# Patient Record
Sex: FEMALE | NOT HISPANIC OR LATINO
[De-identification: names, ages, dates, MRNs, and addresses within clinical notes are randomized per-mention and may not be internally consistent; named-entity substitution may affect disease eponyms.]

---

## 2018-10-03 PROBLEM — Z00.00 ENCOUNTER FOR PREVENTIVE HEALTH EXAMINATION: Status: ACTIVE | Noted: 2018-10-03

## 2018-10-23 ENCOUNTER — APPOINTMENT (OUTPATIENT)
Dept: BREAST CENTER | Facility: CLINIC | Age: 53
End: 2018-10-23
Payer: COMMERCIAL

## 2018-10-23 VITALS
WEIGHT: 150 LBS | BODY MASS INDEX: 23 KG/M2 | DIASTOLIC BLOOD PRESSURE: 92 MMHG | HEIGHT: 67.75 IN | SYSTOLIC BLOOD PRESSURE: 150 MMHG

## 2018-10-23 DIAGNOSIS — Z83.3 FAMILY HISTORY OF DIABETES MELLITUS: ICD-10-CM

## 2018-10-23 DIAGNOSIS — Z78.9 OTHER SPECIFIED HEALTH STATUS: ICD-10-CM

## 2018-10-23 PROCEDURE — 99243 OFF/OP CNSLTJ NEW/EST LOW 30: CPT

## 2018-11-26 ENCOUNTER — FORM ENCOUNTER (OUTPATIENT)
Age: 53
End: 2018-11-26

## 2018-11-27 ENCOUNTER — OUTPATIENT (OUTPATIENT)
Dept: OUTPATIENT SERVICES | Facility: HOSPITAL | Age: 53
LOS: 1 days | End: 2018-11-27
Payer: COMMERCIAL

## 2018-11-27 ENCOUNTER — APPOINTMENT (OUTPATIENT)
Dept: MAMMOGRAPHY | Facility: CLINIC | Age: 53
End: 2018-11-27
Payer: COMMERCIAL

## 2018-11-27 DIAGNOSIS — Z00.8 ENCOUNTER FOR OTHER GENERAL EXAMINATION: ICD-10-CM

## 2018-11-27 PROCEDURE — G0279: CPT

## 2018-11-27 PROCEDURE — 77065 DX MAMMO INCL CAD UNI: CPT | Mod: 26,RT

## 2018-11-27 PROCEDURE — G0279: CPT | Mod: 26

## 2018-11-27 PROCEDURE — 77065 DX MAMMO INCL CAD UNI: CPT

## 2018-12-09 ENCOUNTER — FORM ENCOUNTER (OUTPATIENT)
Age: 53
End: 2018-12-09

## 2018-12-10 ENCOUNTER — APPOINTMENT (OUTPATIENT)
Dept: MAMMOGRAPHY | Facility: CLINIC | Age: 53
End: 2018-12-10
Payer: COMMERCIAL

## 2018-12-10 ENCOUNTER — OUTPATIENT (OUTPATIENT)
Dept: OUTPATIENT SERVICES | Facility: HOSPITAL | Age: 53
LOS: 1 days | End: 2018-12-10
Payer: COMMERCIAL

## 2018-12-10 ENCOUNTER — RESULT REVIEW (OUTPATIENT)
Age: 53
End: 2018-12-10

## 2018-12-10 DIAGNOSIS — Z00.8 ENCOUNTER FOR OTHER GENERAL EXAMINATION: ICD-10-CM

## 2018-12-10 PROCEDURE — 19081 BX BREAST 1ST LESION STRTCTC: CPT

## 2018-12-10 PROCEDURE — 19081 BX BREAST 1ST LESION STRTCTC: CPT | Mod: RT

## 2018-12-10 PROCEDURE — A4648: CPT

## 2018-12-10 PROCEDURE — 19082 BX BREAST ADD LESION STRTCTC: CPT | Mod: RT

## 2018-12-10 PROCEDURE — 19082 BX BREAST ADD LESION STRTCTC: CPT

## 2018-12-10 PROCEDURE — 77065 DX MAMMO INCL CAD UNI: CPT | Mod: 26,RT

## 2018-12-10 PROCEDURE — 77065 DX MAMMO INCL CAD UNI: CPT

## 2018-12-12 LAB — SURGICAL PATHOLOGY FINAL REPORT - CH: SIGNIFICANT CHANGE UP

## 2019-01-06 ENCOUNTER — FORM ENCOUNTER (OUTPATIENT)
Age: 54
End: 2019-01-06

## 2019-01-07 ENCOUNTER — RESULT REVIEW (OUTPATIENT)
Age: 54
End: 2019-01-07

## 2019-01-07 ENCOUNTER — OUTPATIENT (OUTPATIENT)
Dept: OUTPATIENT SERVICES | Facility: HOSPITAL | Age: 54
LOS: 1 days | End: 2019-01-07
Payer: COMMERCIAL

## 2019-01-07 ENCOUNTER — APPOINTMENT (OUTPATIENT)
Dept: ULTRASOUND IMAGING | Facility: CLINIC | Age: 54
End: 2019-01-07
Payer: COMMERCIAL

## 2019-01-07 DIAGNOSIS — Z00.8 ENCOUNTER FOR OTHER GENERAL EXAMINATION: ICD-10-CM

## 2019-01-07 PROCEDURE — 77065 DX MAMMO INCL CAD UNI: CPT | Mod: 26,RT

## 2019-01-07 PROCEDURE — 77065 DX MAMMO INCL CAD UNI: CPT

## 2019-01-07 PROCEDURE — 19083 BX BREAST 1ST LESION US IMAG: CPT | Mod: RT

## 2019-01-07 PROCEDURE — A4648: CPT

## 2019-01-07 PROCEDURE — 88305 TISSUE EXAM BY PATHOLOGIST: CPT | Mod: 26

## 2019-01-07 PROCEDURE — 19083 BX BREAST 1ST LESION US IMAG: CPT

## 2019-01-08 LAB — SURGICAL PATHOLOGY FINAL REPORT - CH: SIGNIFICANT CHANGE UP

## 2019-01-21 ENCOUNTER — FORM ENCOUNTER (OUTPATIENT)
Age: 54
End: 2019-01-21

## 2019-01-22 ENCOUNTER — OUTPATIENT (OUTPATIENT)
Dept: OUTPATIENT SERVICES | Facility: HOSPITAL | Age: 54
LOS: 1 days | End: 2019-01-22
Payer: COMMERCIAL

## 2019-01-22 ENCOUNTER — APPOINTMENT (OUTPATIENT)
Dept: MRI IMAGING | Facility: CLINIC | Age: 54
End: 2019-01-22
Payer: COMMERCIAL

## 2019-01-22 DIAGNOSIS — Z00.8 ENCOUNTER FOR OTHER GENERAL EXAMINATION: ICD-10-CM

## 2019-01-22 PROCEDURE — C8908: CPT

## 2019-01-22 PROCEDURE — C8937: CPT

## 2019-01-22 PROCEDURE — 77049 MRI BREAST C-+ W/CAD BI: CPT | Mod: 26

## 2019-01-22 PROCEDURE — A9585: CPT

## 2019-01-28 ENCOUNTER — APPOINTMENT (OUTPATIENT)
Dept: BREAST CENTER | Facility: CLINIC | Age: 54
End: 2019-01-28
Payer: COMMERCIAL

## 2019-01-28 DIAGNOSIS — Z87.891 PERSONAL HISTORY OF NICOTINE DEPENDENCE: ICD-10-CM

## 2019-01-28 PROCEDURE — 99215 OFFICE O/P EST HI 40 MIN: CPT

## 2019-01-28 NOTE — DATA REVIEWED
[FreeTextEntry1] : Mammogram:  09/19/18   Findings:  1. Calcifications superior central left breast.  2.Questionable asymmetry lateral left breast and microcalcifications.\par \par Diagnostic mammogram: 9/27/18   Findings:  Right breast - Grouped coarse heterogeneous calcifications in the posterior superior central right breast spanning 8 mm.  Additional calcifications seen both anterior and posterior.    Left breast - grouped punctate and amorphous calcifications the majority of which layer as benign milk of calcium The area of asymmetry laterally does not persist on compression imaging.\par \par Breast ultrasound:  9/27/18  Findings:  Right breast- 12 x 7 x 11 mm hypoechoic mass at 12:00 N2.\par Left breast - 6 mm hypoechoic mass at 1:00 N4.  5 mm cyst at 1:00 N5.\par \par Stereotactic Biopsies RIGHT breast x 2: 12/10/18   1) posterior central calcifications  Pathology:  Ectatic ducts with columnar cell change and numerous calcifications.\par 2) Right breast, posterior central, calcifications( anterior group)   Pathology:  DCIS, solid and cribriform type, intermediate to  high nuclear grade with necrosis. \par \par Ultrasound guided core biopsy Right breast nodule (12:00 N2)   PATHOLOGY: Fibroadenoma\par \par Breast MRI:  1/22/19   Results:  Right breast - 1 cm area of enhancement right breast near biopsy clip  (tophat) area of DCIS.   In the LOQ Right breast - 8 mm lobulated oval enhancing mass.  MRI biopsy recommended.   Left breast - at 12:00 there is nonmass enhancement measuring 12 mm.  MR biopsy recommended.   Axilla:  No adenopathy.  14 mm T2 hyperintense liver lesion, probable cyst or hemangioma.\par \par \par

## 2019-01-28 NOTE — HISTORY OF PRESENT ILLNESS
[FreeTextEntry1] : 53 year old female diagnosed with DCIS of the right breast underwent a preoperative breast MRI and was noted to have findings in both breasts.  She is here with her  to discuss her next steps.

## 2019-02-13 ENCOUNTER — FORM ENCOUNTER (OUTPATIENT)
Age: 54
End: 2019-02-13

## 2019-02-14 ENCOUNTER — APPOINTMENT (OUTPATIENT)
Dept: MRI IMAGING | Facility: CLINIC | Age: 54
End: 2019-02-14
Payer: COMMERCIAL

## 2019-02-14 ENCOUNTER — OUTPATIENT (OUTPATIENT)
Dept: OUTPATIENT SERVICES | Facility: HOSPITAL | Age: 54
LOS: 1 days | End: 2019-02-14
Payer: COMMERCIAL

## 2019-02-14 ENCOUNTER — RESULT REVIEW (OUTPATIENT)
Age: 54
End: 2019-02-14

## 2019-02-14 DIAGNOSIS — Z00.8 ENCOUNTER FOR OTHER GENERAL EXAMINATION: ICD-10-CM

## 2019-02-14 PROCEDURE — 77066 DX MAMMO INCL CAD BI: CPT

## 2019-02-14 PROCEDURE — A9585: CPT

## 2019-02-14 PROCEDURE — 77066 DX MAMMO INCL CAD BI: CPT | Mod: 26

## 2019-02-14 PROCEDURE — 88305 TISSUE EXAM BY PATHOLOGIST: CPT | Mod: 26

## 2019-02-14 PROCEDURE — 19085 BX BREAST 1ST LESION MR IMAG: CPT

## 2019-02-14 PROCEDURE — 88305 TISSUE EXAM BY PATHOLOGIST: CPT

## 2019-02-14 PROCEDURE — A4648: CPT

## 2019-02-14 PROCEDURE — 19086 BX BREAST ADD LESION MR IMAG: CPT | Mod: LT

## 2019-02-14 PROCEDURE — 19086 BX BREAST ADD LESION MR IMAG: CPT

## 2019-02-14 PROCEDURE — 19085 BX BREAST 1ST LESION MR IMAG: CPT | Mod: RT

## 2019-02-15 LAB — SURGICAL PATHOLOGY FINAL REPORT - CH: SIGNIFICANT CHANGE UP

## 2019-03-11 ENCOUNTER — OUTPATIENT (OUTPATIENT)
Dept: OUTPATIENT SERVICES | Facility: HOSPITAL | Age: 54
LOS: 1 days | Discharge: HOME | End: 2019-03-11
Payer: COMMERCIAL

## 2019-03-11 DIAGNOSIS — D05.11 INTRADUCTAL CARCINOMA IN SITU OF RIGHT BREAST: ICD-10-CM

## 2019-03-11 DIAGNOSIS — Z98.890 OTHER SPECIFIED POSTPROCEDURAL STATES: Chronic | ICD-10-CM

## 2019-03-11 DIAGNOSIS — Z01.818 ENCOUNTER FOR OTHER PREPROCEDURAL EXAMINATION: ICD-10-CM

## 2019-03-11 LAB
ALBUMIN SERPL ELPH-MCNC: 4.4 G/DL — SIGNIFICANT CHANGE UP (ref 3.3–5)
ALP SERPL-CCNC: 69 U/L — SIGNIFICANT CHANGE UP (ref 40–120)
ALT FLD-CCNC: 23 U/L — SIGNIFICANT CHANGE UP (ref 12–78)
ANION GAP SERPL CALC-SCNC: 6 MMOL/L — SIGNIFICANT CHANGE UP (ref 5–17)
AST SERPL-CCNC: 18 U/L — SIGNIFICANT CHANGE UP (ref 15–37)
BASOPHILS # BLD AUTO: 0.04 K/UL — SIGNIFICANT CHANGE UP (ref 0–0.2)
BASOPHILS NFR BLD AUTO: 0.5 % — SIGNIFICANT CHANGE UP (ref 0–2)
BILIRUB SERPL-MCNC: 0.7 MG/DL — SIGNIFICANT CHANGE UP (ref 0.2–1.2)
BUN SERPL-MCNC: 13 MG/DL — SIGNIFICANT CHANGE UP (ref 7–23)
CALCIUM SERPL-MCNC: 9 MG/DL — SIGNIFICANT CHANGE UP (ref 8.5–10.1)
CHLORIDE SERPL-SCNC: 105 MMOL/L — SIGNIFICANT CHANGE UP (ref 96–108)
CO2 SERPL-SCNC: 27 MMOL/L — SIGNIFICANT CHANGE UP (ref 22–31)
CREAT SERPL-MCNC: 0.82 MG/DL — SIGNIFICANT CHANGE UP (ref 0.5–1.3)
EOSINOPHIL # BLD AUTO: 0.13 K/UL — SIGNIFICANT CHANGE UP (ref 0–0.5)
EOSINOPHIL NFR BLD AUTO: 1.8 % — SIGNIFICANT CHANGE UP (ref 0–6)
GLUCOSE SERPL-MCNC: 104 MG/DL — HIGH (ref 70–99)
HCT VFR BLD CALC: 42.7 % — SIGNIFICANT CHANGE UP (ref 34.5–45)
HGB BLD-MCNC: 14.8 G/DL — SIGNIFICANT CHANGE UP (ref 11.5–15.5)
IMM GRANULOCYTES NFR BLD AUTO: 0.1 % — SIGNIFICANT CHANGE UP (ref 0–1.5)
LYMPHOCYTES # BLD AUTO: 1.25 K/UL — SIGNIFICANT CHANGE UP (ref 1–3.3)
LYMPHOCYTES # BLD AUTO: 17 % — SIGNIFICANT CHANGE UP (ref 13–44)
MCHC RBC-ENTMCNC: 31.8 PG — SIGNIFICANT CHANGE UP (ref 27–34)
MCHC RBC-ENTMCNC: 34.7 GM/DL — SIGNIFICANT CHANGE UP (ref 32–36)
MCV RBC AUTO: 91.8 FL — SIGNIFICANT CHANGE UP (ref 80–100)
MONOCYTES # BLD AUTO: 0.39 K/UL — SIGNIFICANT CHANGE UP (ref 0–0.9)
MONOCYTES NFR BLD AUTO: 5.3 % — SIGNIFICANT CHANGE UP (ref 2–14)
NEUTROPHILS # BLD AUTO: 5.55 K/UL — SIGNIFICANT CHANGE UP (ref 1.8–7.4)
NEUTROPHILS NFR BLD AUTO: 75.3 % — SIGNIFICANT CHANGE UP (ref 43–77)
NRBC # BLD: 0 /100 WBCS — SIGNIFICANT CHANGE UP (ref 0–0)
PLATELET # BLD AUTO: 256 K/UL — SIGNIFICANT CHANGE UP (ref 150–400)
POTASSIUM SERPL-MCNC: 4.2 MMOL/L — SIGNIFICANT CHANGE UP (ref 3.5–5.3)
POTASSIUM SERPL-SCNC: 4.2 MMOL/L — SIGNIFICANT CHANGE UP (ref 3.5–5.3)
PROT SERPL-MCNC: 8 GM/DL — SIGNIFICANT CHANGE UP (ref 6–8.3)
RBC # BLD: 4.65 M/UL — SIGNIFICANT CHANGE UP (ref 3.8–5.2)
RBC # FLD: 12 % — SIGNIFICANT CHANGE UP (ref 10.3–14.5)
SODIUM SERPL-SCNC: 138 MMOL/L — SIGNIFICANT CHANGE UP (ref 135–145)
WBC # BLD: 7.37 K/UL — SIGNIFICANT CHANGE UP (ref 3.8–10.5)
WBC # FLD AUTO: 7.37 K/UL — SIGNIFICANT CHANGE UP (ref 3.8–10.5)

## 2019-03-11 PROCEDURE — 93010 ELECTROCARDIOGRAM REPORT: CPT

## 2019-03-11 PROCEDURE — 71046 X-RAY EXAM CHEST 2 VIEWS: CPT | Mod: 26

## 2019-03-11 NOTE — CHART NOTE - NSCHARTNOTEFT_GEN_A_CORE
Plan  1. Stop all NSAIDS, herbal supplements and vitamins for 7 days.  2. NPO at midnight.  3. Use EZ sponges as directed  4. Labs, EKG, CXR as per surgeon

## 2019-03-11 NOTE — ASU PATIENT PROFILE, ADULT - PATIENT'S HEIGHT AND WEIGHT RECORDED IN THE VITAL SIGNS FLOWSHEET
HT 68 inches   lbs  BP   T 98  P 75  R 20   O2sat 100%/yes HT 68 inches   lbs  /82  T 98  P 75  R 20   O2sat 100%/yes

## 2019-03-13 ENCOUNTER — FORM ENCOUNTER (OUTPATIENT)
Age: 54
End: 2019-03-13

## 2019-03-14 ENCOUNTER — APPOINTMENT (OUTPATIENT)
Dept: MAMMOGRAPHY | Facility: CLINIC | Age: 54
End: 2019-03-14
Payer: COMMERCIAL

## 2019-03-14 ENCOUNTER — OUTPATIENT (OUTPATIENT)
Dept: OUTPATIENT SERVICES | Facility: HOSPITAL | Age: 54
LOS: 1 days | End: 2019-03-14
Payer: COMMERCIAL

## 2019-03-14 DIAGNOSIS — Z98.890 OTHER SPECIFIED POSTPROCEDURAL STATES: Chronic | ICD-10-CM

## 2019-03-14 DIAGNOSIS — Z00.8 ENCOUNTER FOR OTHER GENERAL EXAMINATION: ICD-10-CM

## 2019-03-14 PROBLEM — D05.11 INTRADUCTAL CARCINOMA IN SITU OF RIGHT BREAST: Chronic | Status: ACTIVE | Noted: 2019-03-11

## 2019-03-14 PROBLEM — D14.32: Chronic | Status: ACTIVE | Noted: 2019-03-11

## 2019-03-14 PROCEDURE — 19281 PERQ DEVICE BREAST 1ST IMAG: CPT | Mod: RT

## 2019-03-14 PROCEDURE — 19281 PERQ DEVICE BREAST 1ST IMAG: CPT

## 2019-03-14 PROCEDURE — C1739: CPT

## 2019-03-20 NOTE — DATA REVIEWED
[FreeTextEntry1] : Mammogram:  09/19/18   Findings:  1. Calcifications superior central left breast.  2.Questionable asymmetry lateral left breast and microcalcifications.  Diagnostic mammogram: 9/27/18   Findings:  Right breast - Grouped coarse heterogeneous calcifications in the posterior superior central right breast spanning 8 mm.  Additional calcifications seen both anterior and posterior.    Left breast - grouped punctate and amorphous calcifications the majority of which layer as benign milk of calcium The area of asymmetry laterally does not persist on compression imaging.  Breast ultrasound:  9/27/18  Findings:  Right breast- 12 x 7 x 11 mm hypoechoic mass at 12:00 N2. Left breast - 6 mm hypoechoic mass at 1:00 N4.  5 mm cyst at 1:00 N5.  Stereotactic Biopsies RIGHT breast x 2: 12/10/18   1) posterior central calcifications  Pathology:  Ectatic ducts with columnar cell change and numerous calcifications. 2) Right breast, posterior central, calcifications( anterior group)   Pathology:  DCIS, solid and cribriform type, intermediate to  high nuclear grade with necrosis. (  Ultrasound guided core biopsy Right breast nodule (12:00 N2)   PATHOLOGY: Fibroadenoma  Breast MRI:  1/22/19   Results:  Right breast - 1 cm area of enhancement right breast near biopsy clip  (tophat) area of DCIS.   In the LOQ Right breast - 8 mm lobulated oval enhancing mass.  MRI biopsy recommended.   Left breast - at 12:00 there is nonmass enhancement measuring 12 mm.  MR biopsy recommended.   Axilla:  No adenopathy.  14 mm T2 hyperintense liver lesion, probable cyst or hemangioma.

## 2019-03-20 NOTE — HISTORY OF PRESENT ILLNESS
[FreeTextEntry1] : 53 year old female diagnosed with DCIS of the right breast  is being admitted to [facility] for a right breast lumpectomy with Ruth localization.

## 2019-03-20 NOTE — PHYSICAL EXAM
[Sclera nonicteric] : sclera nonicteric [Normocephalic] : normocephalic [Conjunctiva pink] : conjunctiva pink [Supple] : supple [No Supraclavicular Adenopathy] : no supraclavicular adenopathy [No Cervical Adenopathy] : no cervical adenopathy [Clear to Auscultation Bilat] : clear to auscultation bilaterally [No Thyromegaly] : no thyromegaly [Normal Sinus Rhythm] : normal sinus rhythm [No Gallops] : no gallops [No Rubs] : no pericardial rub [Examined in the supine and seated position] : examined in the supine and seated position [Symmetrical] : symmetrical [No dominant masses] : no dominant masses in right breast  [No dominant masses] : no dominant masses left breast [No Nipple Retraction] : no left nipple retraction [No Nipple Discharge] : no left nipple discharge [No Axillary Lymphadenopathy] : no left axillary lymphadenopathy [Soft] : abdomen soft [No Hepato-Splenomegaly] : no hepato-splenomegaly [No Edema] : no edema [No Rashes] : no rashes [No Ulceration] : no ulceration

## 2019-03-22 ENCOUNTER — RESULT REVIEW (OUTPATIENT)
Age: 54
End: 2019-03-22

## 2019-03-22 ENCOUNTER — APPOINTMENT (OUTPATIENT)
Dept: BREAST CENTER | Facility: HOSPITAL | Age: 54
End: 2019-03-22
Payer: COMMERCIAL

## 2019-03-22 ENCOUNTER — OUTPATIENT (OUTPATIENT)
Dept: OUTPATIENT SERVICES | Facility: HOSPITAL | Age: 54
LOS: 1 days | Discharge: HOME | End: 2019-03-22
Payer: COMMERCIAL

## 2019-03-22 VITALS
HEART RATE: 78 BPM | SYSTOLIC BLOOD PRESSURE: 158 MMHG | WEIGHT: 149.91 LBS | HEIGHT: 68 IN | RESPIRATION RATE: 14 BRPM | OXYGEN SATURATION: 100 % | DIASTOLIC BLOOD PRESSURE: 99 MMHG | TEMPERATURE: 99 F

## 2019-03-22 VITALS
RESPIRATION RATE: 14 BRPM | DIASTOLIC BLOOD PRESSURE: 83 MMHG | OXYGEN SATURATION: 100 % | HEART RATE: 74 BPM | SYSTOLIC BLOOD PRESSURE: 133 MMHG | TEMPERATURE: 97 F

## 2019-03-22 DIAGNOSIS — Z98.890 OTHER SPECIFIED POSTPROCEDURAL STATES: Chronic | ICD-10-CM

## 2019-03-22 DIAGNOSIS — D05.11 INTRADUCTAL CARCINOMA IN SITU OF RIGHT BREAST: ICD-10-CM

## 2019-03-22 LAB — HCG UR QL: NEGATIVE — SIGNIFICANT CHANGE UP

## 2019-03-22 PROCEDURE — 76098 X-RAY EXAM SURGICAL SPECIMEN: CPT | Mod: 26

## 2019-03-22 PROCEDURE — 19301 PARTIAL MASTECTOMY: CPT

## 2019-03-22 PROCEDURE — 88305 TISSUE EXAM BY PATHOLOGIST: CPT | Mod: 26

## 2019-03-22 PROCEDURE — 88377 M/PHMTRC ALYS ISHQUANT/SEMIQ: CPT | Mod: 26

## 2019-03-22 PROCEDURE — 88307 TISSUE EXAM BY PATHOLOGIST: CPT | Mod: 26

## 2019-03-22 PROCEDURE — 88342 IMHCHEM/IMCYTCHM 1ST ANTB: CPT | Mod: 26,59

## 2019-03-22 PROCEDURE — 88341 IMHCHEM/IMCYTCHM EA ADD ANTB: CPT | Mod: 26,59

## 2019-03-22 PROCEDURE — 88360 TUMOR IMMUNOHISTOCHEM/MANUAL: CPT | Mod: 26

## 2019-03-22 RX ORDER — PROCHLORPERAZINE MALEATE 5 MG
10 TABLET ORAL ONCE
Qty: 0 | Refills: 0 | Status: COMPLETED | OUTPATIENT
Start: 2019-03-22 | End: 2019-03-22

## 2019-03-22 RX ORDER — OXYCODONE HYDROCHLORIDE 5 MG/1
1 TABLET ORAL
Qty: 8 | Refills: 0 | OUTPATIENT
Start: 2019-03-22 | End: 2019-03-23

## 2019-03-22 RX ORDER — MEPERIDINE HYDROCHLORIDE 50 MG/ML
12.5 INJECTION INTRAMUSCULAR; INTRAVENOUS; SUBCUTANEOUS
Qty: 0 | Refills: 0 | Status: DISCONTINUED | OUTPATIENT
Start: 2019-03-22 | End: 2019-03-22

## 2019-03-22 RX ORDER — OXYCODONE HYDROCHLORIDE 5 MG/1
5 TABLET ORAL ONCE
Qty: 0 | Refills: 0 | Status: DISCONTINUED | OUTPATIENT
Start: 2019-03-22 | End: 2019-03-22

## 2019-03-22 RX ORDER — SODIUM CHLORIDE 9 MG/ML
1000 INJECTION, SOLUTION INTRAVENOUS
Qty: 0 | Refills: 0 | Status: DISCONTINUED | OUTPATIENT
Start: 2019-03-22 | End: 2019-03-22

## 2019-03-22 RX ORDER — FENTANYL CITRATE 50 UG/ML
50 INJECTION INTRAVENOUS
Qty: 0 | Refills: 0 | Status: DISCONTINUED | OUTPATIENT
Start: 2019-03-22 | End: 2019-03-22

## 2019-03-22 RX ORDER — ONDANSETRON 8 MG/1
4 TABLET, FILM COATED ORAL ONCE
Qty: 0 | Refills: 0 | Status: COMPLETED | OUTPATIENT
Start: 2019-03-22 | End: 2019-03-22

## 2019-03-22 RX ADMIN — ONDANSETRON 4 MILLIGRAM(S): 8 TABLET, FILM COATED ORAL at 11:38

## 2019-03-22 RX ADMIN — Medication 10 MILLIGRAM(S): at 11:54

## 2019-03-22 NOTE — ASU PATIENT PROFILE, ADULT - PSH
History of breast surgery  right breast - excision of cyst 1987  History of lung surgery  resection of left  lung tumor 1984

## 2019-03-22 NOTE — ASU DISCHARGE PLAN (ADULT/PEDIATRIC) - CALL YOUR DOCTOR IF YOU HAVE ANY OF THE FOLLOWING:
Wound/Surgical Site with redness, or foul smelling discharge or pus/Bleeding that does not stop/Swelling that gets worse/Pain not relieved by Medications

## 2019-03-22 NOTE — BRIEF OPERATIVE NOTE - NSICDXBRIEFPREOP_GEN_ALL_CORE_FT
PRE-OP DIAGNOSIS:  Ductal carcinoma in situ of right breast 22-Mar-2019 10:05:45  Roscoe Gandhi
PRE-OP DIAGNOSIS:  Ductal carcinoma in situ of right breast 22-Mar-2019 10:05:45  Roscoe Gandhi  Ductal carcinoma in situ (DCIS) of right breast 22-Mar-2019 11:36:13  Yancy Berrios

## 2019-03-22 NOTE — BRIEF OPERATIVE NOTE - OPERATION/FINDINGS
yamila  /clip  identified on ontraop radiograph within specimen
Localizing clip with adjacent calcifications  and Ruth reflector identified on specimen radiograph.  Margin Probe identified 4 positive margins - lateral, superior, posterior and medial.

## 2019-03-22 NOTE — ASU DISCHARGE PLAN (ADULT/PEDIATRIC) - ASU DC SPECIAL INSTRUCTIONSFT
May use icepack (15 minutes on/off) to site for pain relief  Use extra strength Tylenol prn as instructed on bottle for pain relief  oxycodone prescription sent to pharmacy to use for break through pain - do not drive while taking this pain medication    Do not use NSAIDS (example : Ibuprofen,motrin , advil etc) over the next few days

## 2019-03-22 NOTE — ASU DISCHARGE PLAN (ADULT/PEDIATRIC) - CARE PROVIDER_API CALL
Yancy Berrios)  Surgery  [address], [place] [address]  Phone: [phone]  Fax: [phone]  Follow Up Time:

## 2019-03-22 NOTE — ASU DISCHARGE PLAN (ADULT/PEDIATRIC) - PROCEDURE
right breast lumpectomy with yamila  localization right breast lumpectomy with yamila  localization,  radiofrequency assessment using Margin Probe

## 2019-03-22 NOTE — BRIEF OPERATIVE NOTE - NSICDXBRIEFPOSTOP_GEN_ALL_CORE_FT
POST-OP DIAGNOSIS:  Ductal carcinoma of right breast 22-Mar-2019 10:08:34  Roscoe Gandhi
POST-OP DIAGNOSIS:  Ductal carcinoma in situ (DCIS) of right breast 22-Mar-2019 11:36:26  Yancy Berrios  Ductal carcinoma of right breast 22-Mar-2019 10:08:34  Roscoe Gandhi

## 2019-03-22 NOTE — BRIEF OPERATIVE NOTE - NSICDXBRIEFPROCEDURE_GEN_ALL_CORE_FT
PROCEDURES:  Lumpectomy, breast, right 22-Mar-2019 10:04:22 with yamila  Roscoe Gandhi
PROCEDURES:  Right breast lumpectomy 22-Mar-2019 11:34:46 lumpectomy with Ruth localization , Radiofrequency margin assessment with Margin Probe Yancy Berrios  Lumpectomy, breast, right 22-Mar-2019 10:04:22 with ruth  Roscoe Gandhi

## 2019-03-27 LAB — SURGICAL PATHOLOGY FINAL REPORT - CH: SIGNIFICANT CHANGE UP

## 2019-03-29 DIAGNOSIS — D05.11 INTRADUCTAL CARCINOMA IN SITU OF RIGHT BREAST: ICD-10-CM

## 2019-03-29 DIAGNOSIS — J30.1 ALLERGIC RHINITIS DUE TO POLLEN: ICD-10-CM

## 2019-03-29 DIAGNOSIS — N64.1 FAT NECROSIS OF BREAST: ICD-10-CM

## 2019-03-29 DIAGNOSIS — J30.81 ALLERGIC RHINITIS DUE TO ANIMAL (CAT) (DOG) HAIR AND DANDER: ICD-10-CM

## 2019-03-29 DIAGNOSIS — N61.0 MASTITIS WITHOUT ABSCESS: ICD-10-CM

## 2019-03-29 DIAGNOSIS — J30.89 OTHER ALLERGIC RHINITIS: ICD-10-CM

## 2019-03-29 DIAGNOSIS — Z87.891 PERSONAL HISTORY OF NICOTINE DEPENDENCE: ICD-10-CM

## 2019-03-29 DIAGNOSIS — Z83.3 FAMILY HISTORY OF DIABETES MELLITUS: ICD-10-CM

## 2019-03-29 DIAGNOSIS — N64.89 OTHER SPECIFIED DISORDERS OF BREAST: ICD-10-CM

## 2019-03-29 DIAGNOSIS — N60.21 FIBROADENOSIS OF RIGHT BREAST: ICD-10-CM

## 2019-03-29 DIAGNOSIS — N60.31 FIBROSCLEROSIS OF RIGHT BREAST: ICD-10-CM

## 2019-04-01 ENCOUNTER — APPOINTMENT (OUTPATIENT)
Dept: BREAST CENTER | Facility: CLINIC | Age: 54
End: 2019-04-01
Payer: COMMERCIAL

## 2019-04-01 PROCEDURE — 99024 POSTOP FOLLOW-UP VISIT: CPT

## 2019-04-01 NOTE — PAST MEDICAL HISTORY
[Menarche Age ____] : age at menarche was [unfilled] [Definite ___ (Date)] : the last menstrual period was [unfilled] [Irregular Cycle Intervals] : are  irregular

## 2019-04-08 NOTE — HISTORY OF PRESENT ILLNESS
[FreeTextEntry1] : 53 year old perimenopausal female presents for a postop visit.  She underwent a right lumpectomy with Ruth localization on 3/22/19 for DCIS.  Final pathology revealed 2 small foci of invasive ductal carcinoma.

## 2019-04-08 NOTE — DATA REVIEWED
[FreeTextEntry1] : Mammogram:  09/19/18   Findings:  1. Calcifications superior central left breast.  2.Questionable asymmetry lateral left breast and microcalcifications.  Diagnostic mammogram: 9/27/18   Findings:  Right breast - Grouped coarse heterogeneous calcifications in the posterior superior central right breast spanning 8 mm.  Additional calcifications seen both anterior and posterior.    Left breast - grouped punctate and amorphous calcifications the majority of which layer as benign milk of calcium The area of asymmetry laterally does not persist on compression imaging.  Breast ultrasound:  9/27/18  Findings:  Right breast- 12 x 7 x 11 mm hypoechoic mass at 12:00 N2. Left breast - 6 mm hypoechoic mass at 1:00 N4.  5 mm cyst at 1:00 N5.  Stereotactic Biopsies RIGHT breast x 2: 12/10/18   1) posterior central calcifications  Pathology:  Ectatic ducts with columnar cell change and numerous calcifications. 2) Right breast, posterior central, calcifications( anterior group)   Pathology:  DCIS, solid and cribriform type, intermediate to  high nuclear grade with necrosis. (  Ultrasound guided core biopsy Right breast nodule (12:00 N2)   PATHOLOGY: Fibroadenoma  Breast MRI:  1/22/19   Results:  Right breast - 1 cm area of enhancement right breast near biopsy clip  (tophat) area of DCIS.   In the LOQ Right breast - 8 mm lobulated oval enhancing mass.  MRI biopsy recommended.   Left breast - at 12:00 there is nonmass enhancement measuring 12 mm.  MR biopsy recommended.   Axilla:  No adenopathy.  14 mm T2 hyperintense liver lesion, probable cyst or hemangioma.   \par \par SURGICAL PATHOLOGY:  3/27/19   ( rIGHT LUMPECTOMY 12:00)   RESULTS:   intermediate to high grade DCIS. 2 foci of infiltrating ductal carcinoma ( 7/9) measuring 2 and 3 mm each.  Margins of resection negative.   ER/ME/HER2 pending on invasive component.

## 2019-04-16 NOTE — PAST MEDICAL HISTORY
[Definite ___ (Date)] : the last menstrual period was [unfilled] [Menarche Age ____] : age at menarche was [unfilled] [Irregular Cycle Intervals] : are  irregular

## 2019-04-16 NOTE — PHYSICAL EXAM
[Atraumatic] : atraumatic [Normocephalic] : normocephalic [Supple] : supple [No Supraclavicular Adenopathy] : no supraclavicular adenopathy [Examined in the supine and seated position] : examined in the supine and seated position [No dominant masses] : no dominant masses in right breast  [No dominant masses] : no dominant masses left breast [No Nipple Retraction] : no right nipple retraction [No Nipple Discharge] : no left nipple discharge [No Axillary Lymphadenopathy] : no right axillary lymphadenopathy [No Edema] : no edema [No Rashes] : no rashes [de-identified] : Lumpectomy incision at 12-1:00 healing well.  Mild swelling [No Ulceration] : no ulceration

## 2019-04-16 NOTE — HISTORY OF PRESENT ILLNESS
[FreeTextEntry1] : 53 year old perimenopausal female underwent a right lumpectomy with Ruth localization on 3/22/19 for DCIS.  Final pathology revealed 2 small foci of invasive ductal carcinoma.  Margins of resection were negative.   The patient is being admitted to [facility] for a right axillary sentinel node biopsy with nuclear injection and possible axillary node dissection.

## 2019-04-16 NOTE — DATA REVIEWED
[FreeTextEntry1] : Mammogram:  09/19/18   Findings:  1. Calcifications superior central left breast.  2.Questionable asymmetry lateral left breast and microcalcifications.  Diagnostic mammogram: 9/27/18   Findings:  Right breast - Grouped coarse heterogeneous calcifications in the posterior superior central right breast spanning 8 mm.  Additional calcifications seen both anterior and posterior.    Left breast - grouped punctate and amorphous calcifications the majority of which layer as benign milk of calcium The area of asymmetry laterally does not persist on compression imaging.  Breast ultrasound:  9/27/18  Findings:  Right breast- 12 x 7 x 11 mm hypoechoic mass at 12:00 N2. Left breast - 6 mm hypoechoic mass at 1:00 N4.  5 mm cyst at 1:00 N5.  Stereotactic Biopsies RIGHT breast x 2: 12/10/18   1) posterior central calcifications  Pathology:  Ectatic ducts with columnar cell change and numerous calcifications. 2) Right breast, posterior central, calcifications( anterior group)   Pathology:  DCIS, solid and cribriform type, intermediate to  high nuclear grade with necrosis. (  Ultrasound guided core biopsy Right breast nodule (12:00 N2)   PATHOLOGY: Fibroadenoma  Breast MRI:  1/22/19   Results:  Right breast - 1 cm area of enhancement right breast near biopsy clip  (tophat) area of DCIS.   In the LOQ Right breast - 8 mm lobulated oval enhancing mass.  MRI biopsy recommended.   Left breast - at 12:00 there is nonmass enhancement measuring 12 mm.  MR biopsy recommended.   Axilla:  No adenopathy.  14 mm T2 hyperintense liver lesion, probable cyst or hemangioma.   \par \par SURGICAL PATHOLOGY:  3/27/19   ( rIGHT LUMPECTOMY 12:00)   RESULTS:   intermediate to high grade DCIS. 2 foci of infiltrating ductal carcinoma ( 7/9) measuring 2 and 3 mm each.  Margins of resection negative.   ER/[place]/HER2 - 50%/1-5%/negative.

## 2019-04-18 ENCOUNTER — APPOINTMENT (OUTPATIENT)
Dept: BREAST CENTER | Facility: HOSPITAL | Age: 54
End: 2019-04-18
Payer: COMMERCIAL

## 2019-04-18 ENCOUNTER — OUTPATIENT (OUTPATIENT)
Dept: OUTPATIENT SERVICES | Facility: HOSPITAL | Age: 54
LOS: 1 days | Discharge: HOME | End: 2019-04-18
Payer: COMMERCIAL

## 2019-04-18 ENCOUNTER — RESULT REVIEW (OUTPATIENT)
Age: 54
End: 2019-04-18

## 2019-04-18 VITALS
OXYGEN SATURATION: 98 % | TEMPERATURE: 98 F | HEIGHT: 68 IN | HEART RATE: 86 BPM | WEIGHT: 149.91 LBS | RESPIRATION RATE: 16 BRPM | DIASTOLIC BLOOD PRESSURE: 90 MMHG | SYSTOLIC BLOOD PRESSURE: 145 MMHG

## 2019-04-18 VITALS
SYSTOLIC BLOOD PRESSURE: 139 MMHG | DIASTOLIC BLOOD PRESSURE: 73 MMHG | HEART RATE: 87 BPM | RESPIRATION RATE: 16 BRPM | OXYGEN SATURATION: 100 % | TEMPERATURE: 98 F

## 2019-04-18 DIAGNOSIS — Z98.890 OTHER SPECIFIED POSTPROCEDURAL STATES: Chronic | ICD-10-CM

## 2019-04-18 LAB — HCG UR QL: NEGATIVE — SIGNIFICANT CHANGE UP

## 2019-04-18 PROCEDURE — 88307 TISSUE EXAM BY PATHOLOGIST: CPT | Mod: 26

## 2019-04-18 PROCEDURE — 38792 RA TRACER ID OF SENTINL NODE: CPT | Mod: 58

## 2019-04-18 PROCEDURE — 38525 BIOPSY/REMOVAL LYMPH NODES: CPT | Mod: 58

## 2019-04-18 PROCEDURE — 88305 TISSUE EXAM BY PATHOLOGIST: CPT | Mod: 26

## 2019-04-18 RX ORDER — ACETAMINOPHEN 500 MG
975 TABLET ORAL ONCE
Qty: 0 | Refills: 0 | Status: COMPLETED | OUTPATIENT
Start: 2019-04-18 | End: 2019-04-18

## 2019-04-18 RX ORDER — FENTANYL CITRATE 50 UG/ML
50 INJECTION INTRAVENOUS
Qty: 0 | Refills: 0 | Status: DISCONTINUED | OUTPATIENT
Start: 2019-04-18 | End: 2019-04-18

## 2019-04-18 RX ORDER — FENTANYL CITRATE 50 UG/ML
25 INJECTION INTRAVENOUS
Qty: 0 | Refills: 0 | Status: DISCONTINUED | OUTPATIENT
Start: 2019-04-18 | End: 2019-04-18

## 2019-04-18 RX ORDER — ONDANSETRON 8 MG/1
4 TABLET, FILM COATED ORAL EVERY 6 HOURS
Qty: 0 | Refills: 0 | Status: DISCONTINUED | OUTPATIENT
Start: 2019-04-18 | End: 2019-05-03

## 2019-04-18 RX ORDER — OXYCODONE HYDROCHLORIDE 5 MG/1
10 TABLET ORAL ONCE
Qty: 0 | Refills: 0 | Status: DISCONTINUED | OUTPATIENT
Start: 2019-04-18 | End: 2019-04-18

## 2019-04-18 RX ORDER — OXYCODONE HYDROCHLORIDE 5 MG/1
5 TABLET ORAL ONCE
Qty: 0 | Refills: 0 | Status: DISCONTINUED | OUTPATIENT
Start: 2019-04-18 | End: 2019-04-18

## 2019-04-18 RX ORDER — ONDANSETRON 8 MG/1
4 TABLET, FILM COATED ORAL ONCE
Qty: 0 | Refills: 0 | Status: COMPLETED | OUTPATIENT
Start: 2019-04-18 | End: 2019-04-18

## 2019-04-18 RX ORDER — OXYCODONE HYDROCHLORIDE 5 MG/1
5 TABLET ORAL EVERY 6 HOURS
Qty: 0 | Refills: 0 | Status: DISCONTINUED | OUTPATIENT
Start: 2019-04-18 | End: 2019-04-18

## 2019-04-18 RX ORDER — FAMOTIDINE 10 MG/ML
20 INJECTION INTRAVENOUS ONCE
Qty: 0 | Refills: 0 | Status: COMPLETED | OUTPATIENT
Start: 2019-04-18 | End: 2019-04-18

## 2019-04-18 RX ADMIN — Medication 975 MILLIGRAM(S): at 09:28

## 2019-04-18 RX ADMIN — FAMOTIDINE 20 MILLIGRAM(S): 10 INJECTION INTRAVENOUS at 09:28

## 2019-04-18 RX ADMIN — ONDANSETRON 4 MILLIGRAM(S): 8 TABLET, FILM COATED ORAL at 11:30

## 2019-04-18 NOTE — ASU PATIENT PROFILE, ADULT - TEACHING/LEARNING LEARNING PREFERENCES
individual instruction/audio/verbal instruction/video/computer/internet/group instruction/pictorial/skill demonstration/written material

## 2019-04-18 NOTE — ASU DISCHARGE PLAN (ADULT/PEDIATRIC) - CARE PROVIDER_API CALL
Yancy Berrios)  Surgery  [address], [place] [address]  Phone: [phone]  Fax: [phone]  Follow Up Time:

## 2019-04-18 NOTE — ASU PATIENT PROFILE, ADULT - PSH
History of breast surgery  right breast - excision of cyst 1987  History of lumpectomy of right breast    History of lung surgery  resection of left  lung tumor 1984

## 2019-04-18 NOTE — BRIEF OPERATIVE NOTE - NSICDXBRIEFPROCEDURE_GEN_ALL_CORE_FT
PROCEDURES:  [place] node mapping with biopsy 18-Apr-2019 11:10:47 right axillary Yancy Berrios

## 2019-04-22 LAB — SURGICAL PATHOLOGY STUDY: SIGNIFICANT CHANGE UP

## 2019-04-24 DIAGNOSIS — Z83.3 FAMILY HISTORY OF DIABETES MELLITUS: ICD-10-CM

## 2019-04-24 DIAGNOSIS — J30.1 ALLERGIC RHINITIS DUE TO POLLEN: ICD-10-CM

## 2019-04-24 DIAGNOSIS — D05.11 INTRADUCTAL CARCINOMA IN SITU OF RIGHT BREAST: ICD-10-CM

## 2019-04-24 DIAGNOSIS — Z87.891 PERSONAL HISTORY OF NICOTINE DEPENDENCE: ICD-10-CM

## 2019-04-24 DIAGNOSIS — Z17.0 ESTROGEN RECEPTOR POSITIVE STATUS [ER+]: ICD-10-CM

## 2019-04-24 DIAGNOSIS — J30.81 ALLERGIC RHINITIS DUE TO ANIMAL (CAT) (DOG) HAIR AND DANDER: ICD-10-CM

## 2019-04-30 ENCOUNTER — APPOINTMENT (OUTPATIENT)
Dept: BREAST CENTER | Facility: CLINIC | Age: 54
End: 2019-04-30
Payer: COMMERCIAL

## 2019-04-30 DIAGNOSIS — R92.0 MAMMOGRAPHIC MICROCALCIFICATION FOUND ON DIAGNOSTIC IMAGING OF BREAST: ICD-10-CM

## 2019-04-30 DIAGNOSIS — R92.8 OTHER ABNORMAL AND INCONCLUSIVE FINDINGS ON DIAGNOSTIC IMAGING OF BREAST: ICD-10-CM

## 2019-04-30 PROCEDURE — 99024 POSTOP FOLLOW-UP VISIT: CPT

## 2019-04-30 NOTE — DATA REVIEWED
[FreeTextEntry1] : Mammogram:  09/19/18   Findings:  1. Calcifications superior central left breast.  2.Questionable asymmetry lateral left breast and microcalcifications.  Diagnostic mammogram: 9/27/18   Findings:  Right breast - Grouped coarse heterogeneous calcifications in the posterior superior central right breast spanning 8 mm.  Additional calcifications seen both anterior and posterior.    Left breast - grouped punctate and amorphous calcifications the majority of which layer as benign milk of calcium The area of asymmetry laterally does not persist on compression imaging.  Breast ultrasound:  9/27/18  Findings:  Right breast- 12 x 7 x 11 mm hypoechoic mass at 12:00 N2. Left breast - 6 mm hypoechoic mass at 1:00 N4.  5 mm cyst at 1:00 N5.  Stereotactic Biopsies RIGHT breast x 2: 12/10/18   1) posterior central calcifications  Pathology:  Ectatic ducts with columnar cell change and numerous calcifications. 2) Right breast, posterior central, calcifications( anterior group)   Pathology:  DCIS, solid and cribriform type, intermediate to  high nuclear grade with necrosis. (  Ultrasound guided core biopsy Right breast nodule (12:00 N2)   PATHOLOGY: Fibroadenoma  Breast MRI:  1/22/19   Results:  Right breast - 1 cm area of enhancement right breast near biopsy clip  (tophat) area of DCIS.   In the LOQ Right breast - 8 mm lobulated oval enhancing mass.  MRI biopsy recommended.   Left breast - at 12:00 there is nonmass enhancement measuring 12 mm.  MR biopsy recommended.   Axilla:  No adenopathy.  14 mm T2 hyperintense liver lesion, probable cyst or hemangioma.   \par \par SURGICAL PATHOLOGY:  3/27/19   ( rIGHT LUMPECTOMY 12:00)   RESULTS:   intermediate to high grade DCIS. 2 foci of infiltrating ductal carcinoma ( 7/9) measuring 2 and 3 mm each.  Margins of resection negative.   ER/[place]/HER2 - 50%/1-5%/negative.\par \par Right [place] Node Biopsy:  4/18/19    Results:  2 sentinel nodes - negative for metastases and 1 nonsentinel node - negative.

## 2019-04-30 NOTE — CONSULT LETTER
[Dear  ___] : Dear ~CHRISTOPHER, [Courtesy Letter:] : I had the pleasure of seeing your patient, [unfilled], in my office today. [Please see my note below.] : Please see my note below. [Consult Closing:] : Thank you very much for allowing me to participate in the care of this patient.  If you have any questions, please do not hesitate to contact me. [Sincerely,] : Sincerely, [FreeTextEntry2] : Yuly Pickens, NP [FreeTextEntry3] : Yancy Berrios MD FACS\par

## 2019-04-30 NOTE — HISTORY OF PRESENT ILLNESS
[FreeTextEntry1] : 53 year old perimenopausal female underwent a right lumpectomy with Ruth localization on 3/22/19 for DCIS.  Final pathology revealed 2 small foci of invasive ductal carcinoma.  Margins of resection were negative.   The patient then underwent a right sentinel node biopsy on 4/18/19. [place] nodes were negative.\par \par TNM staging:  [id number]     1A

## 2019-04-30 NOTE — PHYSICAL EXAM
[Normocephalic] : normocephalic [Atraumatic] : atraumatic [Supple] : supple [No Supraclavicular Adenopathy] : no supraclavicular adenopathy [Examined in the supine and seated position] : examined in the supine and seated position [No dominant masses] : no dominant masses in right breast  [No dominant masses] : no dominant masses left breast [No Nipple Retraction] : no left nipple retraction [No Nipple Discharge] : no left nipple discharge [No Edema] : no edema [No Rashes] : no rashes [No Ulceration] : no ulceration [de-identified] : Lumpectomy incision at 12-1:00 healing well.   [de-identified] : Incision healing well.

## 2019-05-03 PROBLEM — R92.8 ABNORMAL ULTRASOUND OF BREAST: Status: RESOLVED | Noted: 2018-10-23 | Resolved: 2019-05-03

## 2019-05-03 PROBLEM — R92.0 ABNORMAL MAMMOGRAM WITH MICROCALCIFICATION: Status: RESOLVED | Noted: 2018-10-23 | Resolved: 2019-05-03

## 2019-05-14 ENCOUNTER — APPOINTMENT (OUTPATIENT)
Dept: HEMATOLOGY ONCOLOGY | Facility: CLINIC | Age: 54
End: 2019-05-14
Payer: COMMERCIAL

## 2019-05-14 VITALS
TEMPERATURE: 98.6 F | WEIGHT: 152 LBS | DIASTOLIC BLOOD PRESSURE: 101 MMHG | HEIGHT: 67.75 IN | HEART RATE: 80 BPM | BODY MASS INDEX: 23.31 KG/M2 | SYSTOLIC BLOOD PRESSURE: 173 MMHG

## 2019-05-14 DIAGNOSIS — Z80.1 FAMILY HISTORY OF MALIGNANT NEOPLASM OF TRACHEA, BRONCHUS AND LUNG: ICD-10-CM

## 2019-05-14 PROCEDURE — 99244 OFF/OP CNSLTJ NEW/EST MOD 40: CPT

## 2019-05-14 NOTE — CONSULT LETTER
[( Thank you for referring [unfilled] for consultation for _____ )] : Thank you for referring [unfilled] for consultation for [unfilled] [Dear  ___] : Dear ~CHRISTOPHER, [Sincerely,] : Sincerely, [Consult Closing:] : Thank you very much for allowing me to participate in the care of this patient.  If you have any questions, please do not hesitate to contact me. [Please see my note below.] : Please see my note below. [FreeTextEntry3] : Bebe Loomis [FreeTextEntry2] : Dr Yancy Christiansenkit

## 2019-05-14 NOTE — ASSESSMENT
Problem: Goal Outcome Summary  Goal: Goal Outcome Summary  Outcome: No Change  Pt slept.  No complaints.  Plan to continue antibiotics and monitor.       [FreeTextEntry1] : 52 y/o perimenopausal woman diagnosed with small stage IA  ( T1a, N0) ductal cancer  of the right breast ER/NE positive and HER 2 negative, s/p lumpectomy and sentinel LN biopsy ( March- April 2019).\par Discussed diagnosis, very good prognosis, very small risk of systemic and local recurrence. Discussed benefits of adjuvant hormonal therapy- will reduce risk of recurrence by ~ 40%  but benefit in absolute terms will be very small. Explained potential adverse effects of Tamoxifen , monitoring. \par Patient is interested in trying Tamoxifen.\par Rx Tamoxifen 20 mg daily- she will start after radiation. \par Return visit in 6 months/ sooner PRN.

## 2019-05-14 NOTE — REASON FOR VISIT
[Initial Consultation] : an initial consultation [Spouse] : spouse [FreeTextEntry2] : breast cancer , here to discuss adjuvant therapy

## 2019-05-14 NOTE — PHYSICAL EXAM
[Fully active, able to carry on all pre-disease performance without restriction] : Status 0 - Fully active, able to carry on all pre-disease performance without restriction [Normal] : affect appropriate [de-identified] : healed small lumpectomy scar RT breast at 12:00 and axillary scar, no masses, left breast no masses

## 2019-05-14 NOTE — HISTORY OF PRESENT ILLNESS
[Disease: _____________________] : Disease: [unfilled] [T: ___] : T[unfilled] [N: ___] : N[unfilled] [AJCC Stage: ____] : AJCC Stage: [unfilled] [de-identified] : 54 y/o perimenopausal woman presented with an abnormality on screening mammogram.\par Right breast biopsy showed DCIS.\par 3/22/19 Right lumpectomy: two foci of invasive ductal cancer 0.2 cm and 0.4 cm, mod diff , ER 50 % [place] 1-5 %  HER 2 1-2  CISH negative. Residual 0.4 cm and 0.6 cm DCIS. Margins negative.\par 4/18/19 right sentinel node  biopsy : three LNs negative \par T1a, N0  stage I A.\par \par She is in good general health. Not on any prescription medications. Yearly GYN exam- OK ( 5 months ago). LMP Dec 2018.\par  [de-identified] : ER 50 %  [place] 1-5 %  HER 2 CISH neg  [de-identified] : mod diff invasive ductal cancer SBR 6-7/9

## 2019-11-11 ENCOUNTER — APPOINTMENT (OUTPATIENT)
Dept: BREAST CENTER | Facility: CLINIC | Age: 54
End: 2019-11-11
Payer: COMMERCIAL

## 2019-11-11 VITALS — DIASTOLIC BLOOD PRESSURE: 101 MMHG | SYSTOLIC BLOOD PRESSURE: 159 MMHG | HEART RATE: 96 BPM | HEIGHT: 68 IN

## 2019-11-11 PROCEDURE — 99214 OFFICE O/P EST MOD 30 MIN: CPT

## 2019-11-11 NOTE — HISTORY OF PRESENT ILLNESS
[FreeTextEntry1] : 53 year old perimenopausal female underwent a right lumpectomy with Ruth localization on 3/22/19 for DCIS.  Final pathology revealed 2 small foci of invasive ductal carcinoma.  Margins of resection were negative.   The patient then underwent a right sentinel node biopsy on 4/18/19. [place] nodes were negative.  She completed a course of radiation therapy and was started on Tamoxifen.\par Patient presents for a surveillance breast examination.\par \par Pathology: moderately  differentiated invasive ductal cancer SBR 6-7/9 \par TNM stage: [id number]\par AJCC Stage: IA \par Tumor Markers: ER 50 % CA 1-5 % HER 2 CISH

## 2019-11-11 NOTE — PHYSICAL EXAM
[Normocephalic] : normocephalic [Supple] : supple [No Supraclavicular Adenopathy] : no supraclavicular adenopathy [Examined in the supine and seated position] : examined in the supine and seated position [No dominant masses] : no dominant masses in right breast  [No dominant masses] : no dominant masses left breast [No Nipple Retraction] : no left nipple retraction [No Nipple Discharge] : no left nipple discharge [No Edema] : no edema [No Rashes] : no rashes [No Ulceration] : no ulceration [Symmetrical] : symmetrical [No Axillary Lymphadenopathy] : no left axillary lymphadenopathy [Soft] : abdomen soft [Normal Bowel Sounds] : normal bowel sounds  [No Hepato-Splenomegaly] : no hepato-splenomegaly [Sclera nonicteric] : sclera nonicteric [Conjunctiva pink] : conjunctiva pink [Clear to Auscultation Bilat] : clear to auscultation bilaterally [No Gallops] : no gallops [Normal Sinus Rhythm] : normal sinus rhythm [No Rubs] : no pericardial rub [Grade 2] : Ptosis Grade 2 [de-identified] : Lumpectomy incision at 12-1:00 . [de-identified] : Incision healing well.

## 2019-11-11 NOTE — PAST MEDICAL HISTORY
[Menarche Age ____] : age at menarche was [unfilled] [Definite ___ (Date)] : the last menstrual period was [unfilled] [Total Preg ___] : G[unfilled] [Live Births ___] : P[unfilled]  [Age At Live Birth ___] : Age at live birth: [unfilled] [Perimenopausal] : The patient is perimenopausal

## 2019-11-11 NOTE — DATA REVIEWED
[FreeTextEntry1] : Mammogram:  09/19/18   Findings:  1. Calcifications superior central left breast.  2.Questionable asymmetry lateral left breast and microcalcifications.  Diagnostic mammogram: 9/27/18   Findings:  Right breast - Grouped coarse heterogeneous calcifications in the posterior superior central right breast spanning 8 mm.  Additional calcifications seen both anterior and posterior.    Left breast - grouped punctate and amorphous calcifications the majority of which layer as benign milk of calcium The area of asymmetry laterally does not persist on compression imaging.  Breast ultrasound:  9/27/18  Findings:  Right breast- 12 x 7 x 11 mm hypoechoic mass at 12:00 N2. Left breast - 6 mm hypoechoic mass at 1:00 N4.  5 mm cyst at 1:00 N5.  Stereotactic Biopsies RIGHT breast x 2: 12/10/18   1) posterior central calcifications  Pathology:  Ectatic ducts with columnar cell change and numerous calcifications. 2) Right breast, posterior central, calcifications( anterior group)   Pathology:  DCIS, solid and cribriform type, intermediate to  high nuclear grade with necrosis. (  Ultrasound guided core biopsy Right breast nodule (12:00 N2)   PATHOLOGY: Fibroadenoma  Breast MRI:  1/22/19   Results:  Right breast - 1 cm area of enhancement right breast near biopsy clip  (tophat) area of DCIS.   In the LOQ Right breast - 8 mm lobulated oval enhancing mass.  MRI biopsy recommended.   Left breast - at 12:00 there is nonmass enhancement measuring 12 mm.  MR biopsy recommended.   Axilla:  No adenopathy.  14 mm T2 hyperintense liver lesion, probable cyst or hemangioma.   \par \par SURGICAL PATHOLOGY:  3/27/19   ( rIGHT LUMPECTOMY 12:00)   RESULTS:   intermediate to high grade DCIS. 2 foci of infiltrating ductal carcinoma ( 7/9) measuring 2 and 3 mm each.  Margins of resection negative.   ER/[place]/HER2 - 50%/1-5%/negative.\par \par Right [place] Node Biopsy:  4/18/19    Results:  2 sentinel nodes - negative for metastases and 1 nonsentinel node - negative.\par \par

## 2019-11-20 ENCOUNTER — APPOINTMENT (OUTPATIENT)
Dept: HEMATOLOGY ONCOLOGY | Facility: CLINIC | Age: 54
End: 2019-11-20
Payer: COMMERCIAL

## 2019-11-20 VITALS
DIASTOLIC BLOOD PRESSURE: 126 MMHG | TEMPERATURE: 98.2 F | SYSTOLIC BLOOD PRESSURE: 183 MMHG | RESPIRATION RATE: 14 BRPM | WEIGHT: 156 LBS | HEART RATE: 106 BPM | BODY MASS INDEX: 30.63 KG/M2 | HEIGHT: 60 IN

## 2019-11-20 DIAGNOSIS — R03.0 ELEVATED BLOOD-PRESSURE READING, W/OUT DIAGNOSIS OF HYPERTENSION: ICD-10-CM

## 2019-11-20 PROCEDURE — 99214 OFFICE O/P EST MOD 30 MIN: CPT

## 2019-11-20 NOTE — PHYSICAL EXAM
[Fully active, able to carry on all pre-disease performance without restriction] : Status 0 - Fully active, able to carry on all pre-disease performance without restriction [Normal] : grossly intact [de-identified] :  small lumpectomy scar RT breast at 12:00 and axillary scar, no masses, left breast no masses

## 2019-11-20 NOTE — HISTORY OF PRESENT ILLNESS
[Disease: _____________________] : Disease: [unfilled] [N: ___] : N[unfilled] [T: ___] : T[unfilled] [AJCC Stage: ____] : AJCC Stage: [unfilled] [de-identified] : mod diff invasive ductal cancer SBR 6-7/9   [de-identified] : 52 y/o perimenopausal woman presented with an abnormality on screening mammogram.\par Right breast biopsy showed DCIS.\par 3/22/19 Right lumpectomy: two foci of invasive ductal cancer 0.2 cm and 0.4 cm, mod diff , ER 50 % [place] 1-5 %  HER 2 1-2  CISH negative. Residual 0.4 cm and 0.6 cm DCIS. Margins negative.\par 4/18/19 right sentinel node  biopsy : three LNs negative \par T1a, N0  stage I A.\par \par Yearly GYN exam- OK. LMP Dec 2018.\par \par \par Adjuvant RT completed 6/19/19.  [de-identified] : ER 50 %  LA 1-5 %  HER 2 CISH neg  [Treatment Protocol] : Treatment Protocol [de-identified] : On TAmoxifen since July 2019. Tolerating OK. Hot flashes mild, tolerable. No other complaints.\par \par Saw Dr Berrios 11/11/19 - mammo/ sono planned for Jan 2020. \par \par Had elevated BP on several occasions at doctors visits . Did not see her PCP in one year. Denies CP, palpitations, headaches.  [FreeTextEntry1] : adjuvant  Tamoxifen July 2019 -

## 2019-11-20 NOTE — ASSESSMENT
[FreeTextEntry1] : 53 y/o perimenopausal woman diagnosed with small stage IA  ( T1a, N0) ductal cancer  of the right breast ER/DC positive and HER 2 negative, s/p lumpectomy and sentinel LN biopsy ( March- April 2019).\par \par S/p adjuvant RT June 2019.\par \par On adjuvant Tamoxifen since July 2019. Tolerating well.\par \par Cont Tamoxifen x 5 years.\par Mammo/ sono Jan 2020 per Dr Berrios.\par Yearly GYN up to date.\par \par Elevated BP- states- nervous in doctors office- but she ahd elevated BP on multiple occasions in the apst. Family hx of HTN. Discussed importance of treatment- she will call her PCP today- to see PCP today/ tomorrow if possible. \par \par RV in 6 months.

## 2020-01-28 ENCOUNTER — FORM ENCOUNTER (OUTPATIENT)
Age: 55
End: 2020-01-28

## 2020-01-29 ENCOUNTER — APPOINTMENT (OUTPATIENT)
Dept: MAMMOGRAPHY | Facility: CLINIC | Age: 55
End: 2020-01-29
Payer: COMMERCIAL

## 2020-01-29 ENCOUNTER — APPOINTMENT (OUTPATIENT)
Dept: ULTRASOUND IMAGING | Facility: CLINIC | Age: 55
End: 2020-01-29
Payer: COMMERCIAL

## 2020-01-29 ENCOUNTER — OUTPATIENT (OUTPATIENT)
Dept: OUTPATIENT SERVICES | Facility: HOSPITAL | Age: 55
LOS: 1 days | End: 2020-01-29
Payer: COMMERCIAL

## 2020-01-29 DIAGNOSIS — Z00.8 ENCOUNTER FOR OTHER GENERAL EXAMINATION: ICD-10-CM

## 2020-01-29 DIAGNOSIS — Z98.890 OTHER SPECIFIED POSTPROCEDURAL STATES: Chronic | ICD-10-CM

## 2020-01-29 PROCEDURE — 76641 ULTRASOUND BREAST COMPLETE: CPT | Mod: 26,50

## 2020-01-29 PROCEDURE — 77066 DX MAMMO INCL CAD BI: CPT | Mod: 26

## 2020-01-29 PROCEDURE — G0279: CPT | Mod: 26

## 2020-01-29 PROCEDURE — 76641 ULTRASOUND BREAST COMPLETE: CPT

## 2020-01-29 PROCEDURE — G0279: CPT

## 2020-01-29 PROCEDURE — 77066 DX MAMMO INCL CAD BI: CPT

## 2020-06-03 ENCOUNTER — RX RENEWAL (OUTPATIENT)
Age: 55
End: 2020-06-03

## 2020-08-18 ENCOUNTER — OUTPATIENT (OUTPATIENT)
Dept: OUTPATIENT SERVICES | Facility: HOSPITAL | Age: 55
LOS: 1 days | Discharge: HOME | End: 2020-08-18

## 2020-08-18 DIAGNOSIS — Z98.890 OTHER SPECIFIED POSTPROCEDURAL STATES: Chronic | ICD-10-CM

## 2020-08-18 DIAGNOSIS — C50.911 MALIGNANT NEOPLASM OF UNSPECIFIED SITE OF RIGHT FEMALE BREAST: ICD-10-CM

## 2020-09-04 ENCOUNTER — RESULT REVIEW (OUTPATIENT)
Age: 55
End: 2020-09-04

## 2020-09-04 ENCOUNTER — APPOINTMENT (OUTPATIENT)
Dept: MAMMOGRAPHY | Facility: CLINIC | Age: 55
End: 2020-09-04
Payer: COMMERCIAL

## 2020-09-04 ENCOUNTER — OUTPATIENT (OUTPATIENT)
Dept: OUTPATIENT SERVICES | Facility: HOSPITAL | Age: 55
LOS: 1 days | End: 2020-09-04
Payer: COMMERCIAL

## 2020-09-04 DIAGNOSIS — Z98.890 OTHER SPECIFIED POSTPROCEDURAL STATES: Chronic | ICD-10-CM

## 2020-09-04 DIAGNOSIS — Z00.8 ENCOUNTER FOR OTHER GENERAL EXAMINATION: ICD-10-CM

## 2020-09-04 PROCEDURE — G0279: CPT | Mod: 26

## 2020-09-04 PROCEDURE — G0279: CPT

## 2020-09-04 PROCEDURE — 77065 DX MAMMO INCL CAD UNI: CPT

## 2020-09-04 PROCEDURE — 77065 DX MAMMO INCL CAD UNI: CPT | Mod: 26,LT

## 2020-09-16 ENCOUNTER — APPOINTMENT (OUTPATIENT)
Dept: BREAST CENTER | Facility: CLINIC | Age: 55
End: 2020-09-16
Payer: COMMERCIAL

## 2020-09-16 VITALS
HEART RATE: 99 BPM | WEIGHT: 150 LBS | HEIGHT: 68 IN | SYSTOLIC BLOOD PRESSURE: 178 MMHG | DIASTOLIC BLOOD PRESSURE: 102 MMHG | BODY MASS INDEX: 22.73 KG/M2

## 2020-09-16 VITALS — SYSTOLIC BLOOD PRESSURE: 164 MMHG | DIASTOLIC BLOOD PRESSURE: 101 MMHG

## 2020-09-16 PROCEDURE — 99214 OFFICE O/P EST MOD 30 MIN: CPT

## 2020-09-16 NOTE — PAST MEDICAL HISTORY
[Perimenopausal] : The patient is perimenopausal [Definite ___ (Date)] : the last menstrual period was [unfilled] [Menarche Age ____] : age at menarche was [unfilled] [Total Preg ___] : G[unfilled] [Live Births ___] : P[unfilled]  [Age At Live Birth ___] : Age at live birth: [unfilled]

## 2020-09-16 NOTE — HISTORY OF PRESENT ILLNESS
[FreeTextEntry1] : 55 year old perimenopausal female underwent a right lumpectomy with Ruth localization on 3/22/19 for DCIS.  Final pathology revealed 2 small foci of invasive ductal carcinoma.  Margins of resection were negative.   The patient then underwent a right sentinel node biopsy on 4/18/19. [place] nodes were negative.  She completed a course of radiation therapy and was started on Tamoxifen.\par Patient presents for a surveillance breast examination.\par \par Pathology: moderately  differentiated invasive ductal cancer SBR 6-7/9 \par TNM stage: [id number]\par AJCC Stage: IA \par Tumor Markers: ER 50 % AR 1-5 % HER 2 CISH

## 2020-09-16 NOTE — DATA REVIEWED
[FreeTextEntry1] : Mammogram:  09/19/18   Findings:  1. Calcifications superior central left breast.  2.Questionable asymmetry lateral left breast and microcalcifications.  Diagnostic mammogram: 9/27/18   Findings:  Right breast - Grouped coarse heterogeneous calcifications in the posterior superior central right breast spanning 8 mm.  Additional calcifications seen both anterior and posterior.    Left breast - grouped punctate and amorphous calcifications the majority of which layer as benign milk of calcium The area of asymmetry laterally does not persist on compression imaging.  Breast ultrasound:  9/27/18  Findings:  Right breast- 12 x 7 x 11 mm hypoechoic mass at 12:00 N2. Left breast - 6 mm hypoechoic mass at 1:00 N4.  5 mm cyst at 1:00 N5.  Stereotactic Biopsies RIGHT breast x 2: 12/10/18   1) posterior central calcifications  Pathology:  Ectatic ducts with columnar cell change and numerous calcifications. 2) Right breast, posterior central, calcifications( anterior group)   Pathology:  DCIS, solid and cribriform type, intermediate to  high nuclear grade with necrosis. (  Ultrasound guided core biopsy Right breast nodule (12:00 N2)   PATHOLOGY: Fibroadenoma  Breast MRI:  1/22/19   Results:  Right breast - 1 cm area of enhancement right breast near biopsy clip  (tophat) area of DCIS.   In the LOQ Right breast - 8 mm lobulated oval enhancing mass.  MRI biopsy recommended.   Left breast - at 12:00 there is nonmass enhancement measuring 12 mm.  MR biopsy recommended.   Axilla:  No adenopathy.  14 mm T2 hyperintense liver lesion, probable cyst or hemangioma.   \par \par SURGICAL PATHOLOGY:  3/27/19   ( rIGHT LUMPECTOMY 12:00)   RESULTS:   intermediate to high grade DCIS. 2 foci of infiltrating ductal carcinoma ( 7/9) measuring 2 and 3 mm each.  Margins of resection negative.   ER/[place]/HER2 - 50%/1-5%/negative.\par \par Right [place] Node Biopsy:  4/18/19    Results:  2 sentinel nodes - negative for metastases and 1 nonsentinel node - negative.\par \par Left Diagnostic  Mammogram:  9/4/20    Findings:  Stable calcifications UOQ.

## 2020-09-16 NOTE — PHYSICAL EXAM
[Sclera nonicteric] : sclera nonicteric [Normocephalic] : normocephalic [Conjunctiva pink] : conjunctiva pink [Supple] : supple [No Supraclavicular Adenopathy] : no supraclavicular adenopathy [Clear to Auscultation Bilat] : clear to auscultation bilaterally [No Gallops] : no gallops [Normal Sinus Rhythm] : normal sinus rhythm [No Rubs] : no pericardial rub [Examined in the supine and seated position] : examined in the supine and seated position [Symmetrical] : symmetrical [Grade 2] : Ptosis Grade 2 [No dominant masses] : no dominant masses in right breast  [No dominant masses] : no dominant masses left breast [No Nipple Retraction] : no left nipple retraction [No Nipple Discharge] : no left nipple discharge [Soft] : abdomen soft [No Axillary Lymphadenopathy] : no left axillary lymphadenopathy [Normal Bowel Sounds] : normal bowel sounds  [No Hepato-Splenomegaly] : no hepato-splenomegaly [No Ulceration] : no ulceration [No Edema] : no edema [No Rashes] : no rashes [de-identified] : Lumpectomy incision at 12-1:00 . [de-identified] : Incision healing well.

## 2020-09-21 ENCOUNTER — OUTPATIENT (OUTPATIENT)
Dept: OUTPATIENT SERVICES | Facility: HOSPITAL | Age: 55
LOS: 1 days | Discharge: HOME | End: 2020-09-21

## 2020-09-21 DIAGNOSIS — Z98.890 OTHER SPECIFIED POSTPROCEDURAL STATES: Chronic | ICD-10-CM

## 2020-09-21 DIAGNOSIS — C50.911 MALIGNANT NEOPLASM OF UNSPECIFIED SITE OF RIGHT FEMALE BREAST: ICD-10-CM

## 2020-09-25 PROBLEM — R92.1 BREAST CALCIFICATIONS ON MAMMOGRAM: Status: RESOLVED | Noted: 2020-02-04 | Resolved: 2020-09-25

## 2020-09-28 ENCOUNTER — APPOINTMENT (OUTPATIENT)
Dept: HEMATOLOGY ONCOLOGY | Facility: CLINIC | Age: 55
End: 2020-09-28
Payer: COMMERCIAL

## 2020-09-28 VITALS
TEMPERATURE: 97.8 F | HEART RATE: 87 BPM | SYSTOLIC BLOOD PRESSURE: 162 MMHG | BODY MASS INDEX: 24.25 KG/M2 | HEIGHT: 68 IN | WEIGHT: 160 LBS | RESPIRATION RATE: 14 BRPM | DIASTOLIC BLOOD PRESSURE: 96 MMHG

## 2020-09-28 DIAGNOSIS — R92.1 MAMMOGRAPHIC CALCIFICATION FOUND ON DIAGNOSTIC IMAGING OF BREAST: ICD-10-CM

## 2020-09-28 DIAGNOSIS — Z92.3 PERSONAL HISTORY OF IRRADIATION: ICD-10-CM

## 2020-09-28 PROCEDURE — 99213 OFFICE O/P EST LOW 20 MIN: CPT

## 2020-09-28 RX ORDER — FLUTICASONE PROPIONATE 50 MCG
SPRAY, SUSPENSION NASAL
Refills: 0 | Status: ACTIVE | COMMUNITY

## 2020-09-28 NOTE — REVIEW OF SYSTEMS
[Patient Intake Form Reviewed] : Patient intake form was reviewed [Hot Flashes] : hot flashes [Negative] : Allergic/Immunologic

## 2020-09-28 NOTE — HISTORY OF PRESENT ILLNESS
[Disease: _____________________] : Disease: [unfilled] [T: ___] : T[unfilled] [N: ___] : N[unfilled] [AJCC Stage: ____] : AJCC Stage: [unfilled] [de-identified] : YING MCGRATH is a 55 y.o. who we are following for an ER+, HER2-, right sided stage IA breast cancer. \par 9/19/18 - Had her 1st screening mammogram which revealed b/l calcifications as well as left breast asymmetry.   Patient was perimenopausal (LMP 12/2018).\par 9/27/20 -  B/LSono - Right breast with a 1.2cm hypoechoic mass 12:00 2N - biopsy recommended.  Left breast 9mm hypoechoic mass 1:00 4N and 5mm cyst 1:00 5N felt to be benign. \par 9/27/20 - Mammo additional views - Right with grouped course calcifications in the posterior central right breast spanning 8mm with additional calcifications both anterior and posterior spanning 4cm.  Left breast with grouped punctate and amorphous calcifications, the majority of which layer as benign milk of calcium.  \par 12/10/18 - Right breast biopsy (posterior and anterior) - DCIS,intermediate to high nuclear grade, solid and cribriform type, with central necrosis and abundant calcifications.  \par ER 80 - 90%, WA 60%\par 1/7/19 - Additional biopsy right breast 12:00 N2 - Negative for malignancy - Fibroadenoma with focal apocrine metaplasia. \par 2/14/19 - Additional biopsies  - right LOQ, left 12:00 - negative for malignancy.\par 3/22/19 - Right lumpectomy - PATH with two separate foci of IDC measuring 2mm and 3mm, [place] score 6 - 7/9, with DCIS 0.4cm and 0.6cm involving a fibroadenoma.\par pT1a, pNx.  HER2 1- 2+, CISH neg\par 4/18/19 - Right axillary SLND - 2 SLN's and 1 non SLN negative. \par 6/19/19 - Completed XRT to right breast. \par 7/2019 - Started Tamoxifen [de-identified] : invasive ductal carcinoma, [place] score 6 - 7/9 [de-identified] : ER 80 - 90%, HI 60%, HER2 1- 2+, CISH neg [de-identified] : Patient tolerating Tamoxifen well.  Has some hot flashes on a daily basis - but managing.   Saw Dr. Berrios last week  - all OK. \par Patient denies any changes in her family, medical, or social history since her last visit of 11/20/19.

## 2020-09-28 NOTE — ASSESSMENT
[FreeTextEntry1] : Patient is a 55 y.o. with an ER+, HER2, right sided stage IA BC, s/p lumpectomy and XRT, on Tamoxifen since 7/2019. \par Tolerating well.  Clinically remains without evidence of disease. \par Plan is to remain on Tamoxifen for a total of 5 years, or until 7/2024. \par \par Continue routine surveillance:\par Left diagnostic mammo: 9/4/20 - BiRADS 3 - Stable calcifications in the left slightly upper outer breast.  OK to resume yearly mammograms.  Saw Dr. Berrios 9/16/20.  To get mammo/sono 1/2021.  She will f/u with Dr. Berrios after. \par GYN:  Goes yearly, Dr. Idania Rios.   LMP 12/2018.  \par Bone Density:  Not yet.  \par Colonoscopy: NEVER - aware of importance.  Due for annual PE in November, will prob schedule around here. \par PE in 1 year, sooner if indicated. \par \par Dr. Jonathan Boxer\par Dr. Idania Rios\par Dr. Yancy Berrios\par Dr. Nany Bustos\par

## 2020-09-28 NOTE — PHYSICAL EXAM
[Normal] : affect appropriate [de-identified] : anicteric [de-identified] : no c/c/e [de-identified] : no rashes

## 2020-10-28 ENCOUNTER — NON-APPOINTMENT (OUTPATIENT)
Age: 55
End: 2020-10-28

## 2021-01-27 ENCOUNTER — APPOINTMENT (OUTPATIENT)
Dept: RADIATION ONCOLOGY | Facility: CLINIC | Age: 56
End: 2021-01-27

## 2021-01-27 NOTE — DISEASE MANAGEMENT
[Pathological] : TNM Stage: p [IA] : IA [FreeTextEntry4] : G2, HER neg, ER NV pos [TTNM] : 1a [NTNM] : 0 [MTNM] : 0
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
## 2021-04-07 ENCOUNTER — APPOINTMENT (OUTPATIENT)
Dept: MAMMOGRAPHY | Facility: CLINIC | Age: 56
End: 2021-04-07
Payer: COMMERCIAL

## 2021-04-07 ENCOUNTER — RESULT REVIEW (OUTPATIENT)
Age: 56
End: 2021-04-07

## 2021-04-07 ENCOUNTER — OUTPATIENT (OUTPATIENT)
Dept: OUTPATIENT SERVICES | Facility: HOSPITAL | Age: 56
LOS: 1 days | End: 2021-04-07
Payer: COMMERCIAL

## 2021-04-07 ENCOUNTER — APPOINTMENT (OUTPATIENT)
Dept: ULTRASOUND IMAGING | Facility: CLINIC | Age: 56
End: 2021-04-07
Payer: COMMERCIAL

## 2021-04-07 DIAGNOSIS — Z98.890 OTHER SPECIFIED POSTPROCEDURAL STATES: Chronic | ICD-10-CM

## 2021-04-07 DIAGNOSIS — Z85.3 PERSONAL HISTORY OF MALIGNANT NEOPLASM OF BREAST: ICD-10-CM

## 2021-04-07 DIAGNOSIS — Z00.8 ENCOUNTER FOR OTHER GENERAL EXAMINATION: ICD-10-CM

## 2021-04-07 PROCEDURE — G0279: CPT

## 2021-04-07 PROCEDURE — 76641 ULTRASOUND BREAST COMPLETE: CPT | Mod: 26,50

## 2021-04-07 PROCEDURE — G0279: CPT | Mod: 26

## 2021-04-07 PROCEDURE — 77066 DX MAMMO INCL CAD BI: CPT | Mod: 26

## 2021-04-07 PROCEDURE — 76641 ULTRASOUND BREAST COMPLETE: CPT

## 2021-04-07 PROCEDURE — 77066 DX MAMMO INCL CAD BI: CPT

## 2021-04-07 NOTE — PAST MEDICAL HISTORY
[Perimenopausal] : The patient is perimenopausal [Menarche Age ____] : age at menarche was [unfilled] [Definite ___ (Date)] : the last menstrual period was [unfilled] [Total Preg ___] : G[unfilled] [Live Births ___] : P[unfilled]  [Age At Live Birth ___] : Age at live birth: [unfilled]

## 2021-04-08 ENCOUNTER — APPOINTMENT (OUTPATIENT)
Dept: BREAST CENTER | Facility: CLINIC | Age: 56
End: 2021-04-08
Payer: COMMERCIAL

## 2021-04-08 VITALS
HEIGHT: 68 IN | SYSTOLIC BLOOD PRESSURE: 162 MMHG | HEART RATE: 100 BPM | DIASTOLIC BLOOD PRESSURE: 92 MMHG | BODY MASS INDEX: 23.49 KG/M2 | WEIGHT: 155 LBS

## 2021-04-08 PROCEDURE — 99213 OFFICE O/P EST LOW 20 MIN: CPT

## 2021-04-08 PROCEDURE — 99072 ADDL SUPL MATRL&STAF TM PHE: CPT

## 2021-04-08 RX ORDER — CHROMIUM 200 MCG
1000 TABLET ORAL
Refills: 0 | Status: ACTIVE | COMMUNITY

## 2021-04-15 NOTE — DATA REVIEWED
[FreeTextEntry1] : Mammogram:  09/19/18   Findings:  1. Calcifications superior central left breast.  2.Questionable asymmetry lateral left breast and microcalcifications.  Diagnostic mammogram: 9/27/18   Findings:  Right breast - Grouped coarse heterogeneous calcifications in the posterior superior central right breast spanning 8 mm.  Additional calcifications seen both anterior and posterior.    Left breast - grouped punctate and amorphous calcifications the majority of which layer as benign milk of calcium The area of asymmetry laterally does not persist on compression imaging.  Breast ultrasound:  9/27/18  Findings:  Right breast- 12 x 7 x 11 mm hypoechoic mass at 12:00 N2. Left breast - 6 mm hypoechoic mass at 1:00 N4.  5 mm cyst at 1:00 N5.  Stereotactic Biopsies RIGHT breast x 2: 12/10/18   1) posterior central calcifications  Pathology:  Ectatic ducts with columnar cell change and numerous calcifications. 2) Right breast, posterior central, calcifications( anterior group)   Pathology:  DCIS, solid and cribriform type, intermediate to  high nuclear grade with necrosis. (  Ultrasound guided core biopsy Right breast nodule (12:00 N2)   PATHOLOGY: Fibroadenoma  Breast MRI:  1/22/19   Results:  Right breast - 1 cm area of enhancement right breast near biopsy clip  (tophat) area of DCIS.   In the LOQ Right breast - 8 mm lobulated oval enhancing mass.  MRI biopsy recommended.   Left breast - at 12:00 there is nonmass enhancement measuring 12 mm.  MR biopsy recommended.   Axilla:  No adenopathy.  14 mm T2 hyperintense liver lesion, probable cyst or hemangioma.   \par \par SURGICAL PATHOLOGY:  3/27/19   ( rIGHT LUMPECTOMY 12:00)   RESULTS:   intermediate to high grade DCIS. 2 foci of infiltrating ductal carcinoma ( 7/9) measuring 2 and 3 mm each.  Margins of resection negative.   ER/[place]/HER2 - 50%/1-5%/negative.\par \par Right [place] Node Biopsy:  4/18/19    Results:  2 sentinel nodes - negative for metastases and 1 nonsentinel node - negative.\par \par Left Diagnostic  Mammogram:  9/4/20    Findings:  Stable calcifications UOQ.\par \par Mammogram:  1/21/21         Findings: Probable benign calcifications left breast for which a 6 month follow up mammogram is recommended.  \par \par Breast Ultrasound: 1/21/21   Findings:  Stable right breast 12 mm nodule at 12:00 N2, previously biopsied.\par Left breast - stable nodule at 1:00 N4. cysts.\par \par Mammogram:   4/7/21         Findings:  Right breast - Stable calcifications Probable benign relatively scattered calcifications UOQ right breast at lumpectomy site.  Left breast  - Amorphous calcifications mid outer left breast best seen in the CC view. Attempted vacuum assisted stereotactic biopsy recommended.  Faint calcifications in the further posterior outer aspect of the left breast for which a 6 month follow up mammogram is recommended. .   \par \par Breast Ultrasound : 4/7/21  Findings:  Right breast - Stable 12 mm nodule at 12:00 N2. 4 x 3 x 3 mm anechoic nodule likely an oil cyst at 9:00 N5. 6 month follow up.

## 2021-04-15 NOTE — HISTORY OF PRESENT ILLNESS
[FreeTextEntry1] : 55 year old perimenopausal female underwent a right lumpectomy with Ruth localization on 3/22/19 for DCIS.  Final pathology revealed 2 small foci of invasive ductal carcinoma.  Margins of resection were negative.   The patient then underwent a right sentinel node biopsy on 4/18/19. [place] nodes were negative.  She completed a course of radiation therapy and was started on Tamoxifen. Patient presents for a surveillance breast examination.\par \par Pathology: moderately  differentiated invasive ductal cancer SBR 6-7/9 \par TNM stage: [id number]\par AJCC Stage: IA \par Tumor Markers: ER 50 % [place] 1-5 % HER 2 CISH

## 2021-04-15 NOTE — PHYSICAL EXAM
[Normocephalic] : normocephalic [Sclera nonicteric] : sclera nonicteric [Conjunctiva pink] : conjunctiva pink [Supple] : supple [No Supraclavicular Adenopathy] : no supraclavicular adenopathy [Clear to Auscultation Bilat] : clear to auscultation bilaterally [Normal Sinus Rhythm] : normal sinus rhythm [No Gallops] : no gallops [No Rubs] : no pericardial rub [Examined in the supine and seated position] : examined in the supine and seated position [Symmetrical] : symmetrical [Grade 2] : Ptosis Grade 2 [No dominant masses] : no dominant masses in right breast  [No dominant masses] : no dominant masses left breast [No Nipple Retraction] : no left nipple retraction [No Nipple Discharge] : no left nipple discharge [No Axillary Lymphadenopathy] : no left axillary lymphadenopathy [Soft] : abdomen soft [Normal Bowel Sounds] : normal bowel sounds  [No Hepato-Splenomegaly] : no hepato-splenomegaly [No Edema] : no edema [No Rashes] : no rashes [No Ulceration] : no ulceration [de-identified] : Lumpectomy incision at 12-1:00 . [de-identified] : Incision healing well.

## 2021-04-23 NOTE — ASU PREOP CHECKLIST - CHLOROHEXIDINE WASH 2
Last OV 10/1/2020  Future Appointments   Date Time Provider Caren Cotto   4/26/2021 11:20 AM MD HELGA Mayberry BS AMB   4/28/2021  2:00 PM ECHOTWO, RHIANNA RODGERS BS AMB   5/24/2021 10:00 AM MD HELGA Mayberry BS AMB   8/9/2021  3:00 PM REMOTE1, RHIANNA RODGERS BS AMB   11/15/2021 10:30 AM REMOTE1, RHIANNA RODGERS BS AMB   2/21/2022  9:00 AM REMOTE1, RHIANNA RODGERS BS AMB   5/24/2022  2:00 PM PACEMAKER3, RHIANNA RODGERS BS AMB   5/24/2022  2:20 PM MD ANA MARIA Balderas BS AMB
Requested Prescriptions     Pending Prescriptions Disp Refills    glucose blood VI test strips (ASCENSIA AUTODISC VI, ONE TOUCH ULTRA TEST VI) strip 100 Strip 1     Sig: Use to monitor blood sugar twice daily or as directed. [id number] [facility]  [phone]     PT is completely out and would like to  today.
21-Mar-2019

## 2021-04-26 ENCOUNTER — APPOINTMENT (OUTPATIENT)
Dept: MAMMOGRAPHY | Facility: CLINIC | Age: 56
End: 2021-04-26
Payer: COMMERCIAL

## 2021-04-26 ENCOUNTER — RESULT REVIEW (OUTPATIENT)
Age: 56
End: 2021-04-26

## 2021-04-26 ENCOUNTER — OUTPATIENT (OUTPATIENT)
Dept: OUTPATIENT SERVICES | Facility: HOSPITAL | Age: 56
LOS: 1 days | End: 2021-04-26
Payer: COMMERCIAL

## 2021-04-26 DIAGNOSIS — R92.0 MAMMOGRAPHIC MICROCALCIFICATION FOUND ON DIAGNOSTIC IMAGING OF BREAST: ICD-10-CM

## 2021-04-26 DIAGNOSIS — Z98.890 OTHER SPECIFIED POSTPROCEDURAL STATES: Chronic | ICD-10-CM

## 2021-04-26 DIAGNOSIS — Z00.8 ENCOUNTER FOR OTHER GENERAL EXAMINATION: ICD-10-CM

## 2021-04-26 PROCEDURE — 88305 TISSUE EXAM BY PATHOLOGIST: CPT

## 2021-04-26 PROCEDURE — 19081 BX BREAST 1ST LESION STRTCTC: CPT | Mod: LT

## 2021-04-26 PROCEDURE — 77065 DX MAMMO INCL CAD UNI: CPT

## 2021-04-26 PROCEDURE — 88305 TISSUE EXAM BY PATHOLOGIST: CPT | Mod: 26

## 2021-04-26 PROCEDURE — A4648: CPT

## 2021-04-26 PROCEDURE — 19081 BX BREAST 1ST LESION STRTCTC: CPT

## 2021-04-26 PROCEDURE — 77065 DX MAMMO INCL CAD UNI: CPT | Mod: 26,LT

## 2021-05-06 ENCOUNTER — NON-APPOINTMENT (OUTPATIENT)
Age: 56
End: 2021-05-06

## 2021-05-06 ENCOUNTER — APPOINTMENT (OUTPATIENT)
Dept: BREAST CENTER | Facility: CLINIC | Age: 56
End: 2021-05-06
Payer: COMMERCIAL

## 2021-05-06 DIAGNOSIS — N60.92 UNSPECIFIED BENIGN MAMMARY DYSPLASIA OF LEFT BREAST: ICD-10-CM

## 2021-05-06 PROCEDURE — 99072 ADDL SUPL MATRL&STAF TM PHE: CPT

## 2021-05-06 PROCEDURE — 99214 OFFICE O/P EST MOD 30 MIN: CPT

## 2021-05-14 ENCOUNTER — OUTPATIENT (OUTPATIENT)
Dept: OUTPATIENT SERVICES | Facility: HOSPITAL | Age: 56
LOS: 1 days | End: 2021-05-14
Payer: COMMERCIAL

## 2021-05-14 ENCOUNTER — APPOINTMENT (OUTPATIENT)
Dept: MAMMOGRAPHY | Facility: CLINIC | Age: 56
End: 2021-05-14
Payer: COMMERCIAL

## 2021-05-14 DIAGNOSIS — Z98.890 OTHER SPECIFIED POSTPROCEDURAL STATES: Chronic | ICD-10-CM

## 2021-05-14 DIAGNOSIS — Z00.8 ENCOUNTER FOR OTHER GENERAL EXAMINATION: ICD-10-CM

## 2021-05-14 DIAGNOSIS — N60.92 UNSPECIFIED BENIGN MAMMARY DYSPLASIA OF LEFT BREAST: ICD-10-CM

## 2021-05-14 PROCEDURE — 19281 PERQ DEVICE BREAST 1ST IMAG: CPT

## 2021-05-14 PROCEDURE — 19281 PERQ DEVICE BREAST 1ST IMAG: CPT | Mod: LT

## 2021-05-14 PROCEDURE — C1739: CPT

## 2021-05-18 ENCOUNTER — APPOINTMENT (OUTPATIENT)
Dept: DISASTER EMERGENCY | Facility: CLINIC | Age: 56
End: 2021-05-18

## 2021-05-19 LAB — SARS-COV-2 N GENE NPH QL NAA+PROBE: NOT DETECTED

## 2021-05-19 NOTE — DATA REVIEWED
[FreeTextEntry1] : Mammogram:  09/19/18   Findings:  1. Calcifications superior central left breast.  2.Questionable asymmetry lateral left breast and microcalcifications.  Diagnostic mammogram: 9/27/18   Findings:  Right breast - Grouped coarse heterogeneous calcifications in the posterior superior central right breast spanning 8 mm.  Additional calcifications seen both anterior and posterior.    Left breast - grouped punctate and amorphous calcifications the majority of which layer as benign milk of calcium The area of asymmetry laterally does not persist on compression imaging.  Breast ultrasound:  9/27/18  Findings:  Right breast- 12 x 7 x 11 mm hypoechoic mass at 12:00 N2. Left breast - 6 mm hypoechoic mass at 1:00 N4.  5 mm cyst at 1:00 N5.  Stereotactic Biopsies RIGHT breast x 2: 12/10/18   1) posterior central calcifications  Pathology:  Ectatic ducts with columnar cell change and numerous calcifications. 2) Right breast, posterior central, calcifications( anterior group)   Pathology:  DCIS, solid and cribriform type, intermediate to  high nuclear grade with necrosis. (  Ultrasound guided core biopsy Right breast nodule (12:00 N2)   PATHOLOGY: Fibroadenoma  Breast MRI:  1/22/19   Results:  Right breast - 1 cm area of enhancement right breast near biopsy clip  (tophat) area of DCIS.   In the LOQ Right breast - 8 mm lobulated oval enhancing mass.  MRI biopsy recommended.   Left breast - at 12:00 there is nonmass enhancement measuring 12 mm.  MR biopsy recommended.   Axilla:  No adenopathy.  14 mm T2 hyperintense liver lesion, probable cyst or hemangioma.   \par \par SURGICAL PATHOLOGY:  3/27/19   ( rIGHT LUMPECTOMY 12:00)   RESULTS:   intermediate to high grade DCIS. 2 foci of infiltrating ductal carcinoma ( 7/9) measuring 2 and 3 mm each.  Margins of resection negative.   ER/[place]/HER2 - 50%/1-5%/negative.\par \par Right [place] Node Biopsy:  4/18/19    Results:  2 sentinel nodes - negative for metastases and 1 nonsentinel node - negative.\par \par Left Diagnostic  Mammogram:  9/4/20    Findings:  Stable calcifications UOQ.\par \par Mammogram:  1/21/21         Findings: Probable benign calcifications left breast for which a 6 month follow up mammogram is recommended.  \par \par Breast Ultrasound: 1/21/21   Findings:  Stable right breast 12 mm nodule at 12:00 N2, previously biopsied.\par Left breast - stable nodule at 1:00 N4. cysts.\par \par Mammogram:   4/7/21         Findings:  Right breast - Stable calcifications Probable benign relatively scattered calcifications UOQ right breast at lumpectomy site.  Left breast  - Amorphous calcifications mid outer left breast best seen in the CC view. Attempted vacuum assisted stereotactic biopsy recommended.  Faint calcifications in the further posterior outer aspect of the left breast for which a 6 month follow up mammogram is recommended. .   \par \par Breast Ultrasound : 4/7/21  Findings:  Right breast - Stable 12 mm nodule at 12:00 N2. 4 x 3 x 3 mm anechoic nodule likely an oil cyst at 9:00 N5. 6 month follow up.\par \par Left stereotactic biopsy:  4/26/21   Pathology :  ATYPICAL DUCTAL HYPERPLASIA.  ( Tophat clip)\par

## 2021-05-19 NOTE — PHYSICAL EXAM
[Normocephalic] : normocephalic [Sclera nonicteric] : sclera nonicteric [Conjunctiva pink] : conjunctiva pink [Supple] : supple [No Supraclavicular Adenopathy] : no supraclavicular adenopathy [Clear to Auscultation Bilat] : clear to auscultation bilaterally [Normal Sinus Rhythm] : normal sinus rhythm [No Gallops] : no gallops [No Rubs] : no pericardial rub [Examined in the supine and seated position] : examined in the supine and seated position [Symmetrical] : symmetrical [Grade 2] : Ptosis Grade 2 [No dominant masses] : no dominant masses in right breast  [No dominant masses] : no dominant masses left breast [No Nipple Retraction] : no left nipple retraction [No Nipple Discharge] : no left nipple discharge [No Axillary Lymphadenopathy] : no left axillary lymphadenopathy [Soft] : abdomen soft [Normal Bowel Sounds] : normal bowel sounds  [No Hepato-Splenomegaly] : no hepato-splenomegaly [No Edema] : no edema [No Rashes] : no rashes [No Ulceration] : no ulceration [de-identified] : Lumpectomy incision at 12-1:00 . [de-identified] : Incision healing well.

## 2021-05-19 NOTE — DATA REVIEWED
[FreeTextEntry1] : Mammogram:  09/19/18   Findings:  1. Calcifications superior central left breast.  2.Questionable asymmetry lateral left breast and microcalcifications.  Diagnostic mammogram: 9/27/18   Findings:  Right breast - Grouped coarse heterogeneous calcifications in the posterior superior central right breast spanning 8 mm.  Additional calcifications seen both anterior and posterior.    Left breast - grouped punctate and amorphous calcifications the majority of which layer as benign milk of calcium The area of asymmetry laterally does not persist on compression imaging.  Breast ultrasound:  9/27/18  Findings:  Right breast- 12 x 7 x 11 mm hypoechoic mass at 12:00 N2. Left breast - 6 mm hypoechoic mass at 1:00 N4.  5 mm cyst at 1:00 N5.  Stereotactic Biopsies RIGHT breast x 2: 12/10/18   1) posterior central calcifications  Pathology:  Ectatic ducts with columnar cell change and numerous calcifications. 2) Right breast, posterior central, calcifications( anterior group)   Pathology:  DCIS, solid and cribriform type, intermediate to  high nuclear grade with necrosis. (  Ultrasound guided core biopsy Right breast nodule (12:00 N2)   PATHOLOGY: Fibroadenoma  Breast MRI:  1/22/19   Results:  Right breast - 1 cm area of enhancement right breast near biopsy clip  (tophat) area of DCIS.   In the LOQ Right breast - 8 mm lobulated oval enhancing mass.  MRI biopsy recommended.   Left breast - at 12:00 there is nonmass enhancement measuring 12 mm.  MR biopsy recommended.   Axilla:  No adenopathy.  14 mm T2 hyperintense liver lesion, probable cyst or hemangioma.   \par \par SURGICAL PATHOLOGY:  3/27/19   ( rIGHT LUMPECTOMY 12:00)   RESULTS:   intermediate to high grade DCIS. 2 foci of infiltrating ductal carcinoma ( 7/9) measuring 2 and 3 mm each.  Margins of resection negative.   ER/[place]/HER2 - 50%/1-5%/negative.\par \par Right [place] Node Biopsy:  4/18/19    Results:  2 sentinel nodes - negative for metastases and 1 nonsentinel node - negative.\par \par Left Diagnostic  Mammogram:  9/4/20    Findings:  Stable calcifications UOQ.\par \par Mammogram:  1/21/21         Findings: Probable benign calcifications left breast for which a 6 month follow up mammogram is recommended.  \par \par Breast Ultrasound: 1/21/21   Findings:  Stable right breast 12 mm nodule at 12:00 N2, previously biopsied.\par Left breast - stable nodule at 1:00 N4. cysts.\par \par Mammogram:   4/7/21         Findings:  Right breast - Stable calcifications Probable benign relatively scattered calcifications UOQ right breast at lumpectomy site.  Left breast  - Amorphous calcifications mid outer left breast best seen in the CC view. Attempted vacuum assisted stereotactic biopsy recommended.  Faint calcifications in the further posterior outer aspect of the left breast for which a 6 month follow up mammogram is recommended. .   \par \par Breast Ultrasound : 4/7/21  Findings:  Right breast - Stable 12 mm nodule at 12:00 N2. 4 x 3 x 3 mm anechoic nodule likely an oil cyst at 9:00 N5. 6 month follow up.\par \par Left stereotactic biopsy:  4/26/21   Pathology :  ATYPICAL DUCTAL HYPERPLASIA.  ( Tophat clip)

## 2021-05-19 NOTE — HISTORY OF PRESENT ILLNESS
[FreeTextEntry1] : 55 year old perimenopausal female underwent a right lumpectomy with Ruth localization on 3/22/19 for DCIS.  Final pathology revealed 2 small foci of invasive ductal carcinoma.  Margins of resection were negative.   The patient then underwent a right sentinel node biopsy on 4/18/19. [place] nodes were negative.  She completed a course of radiation therapy and was started on Tamoxifen.   She recently underwent a left stereotactic biopsy for calcifications.  Pathology revealed Atypical Ductal Hyperplasia.  The patient is here to discuss further management.\par \par \par Pathology: moderately  differentiated invasive ductal cancer SBR 6-7/9 \par TNM stage: [id number]\par AJCC Stage: IA \par Tumor Markers: ER 50 % OR 1-5 % HER 2 CISH

## 2021-05-19 NOTE — PHYSICAL EXAM
[Normocephalic] : normocephalic [Sclera nonicteric] : sclera nonicteric [Conjunctiva pink] : conjunctiva pink [Supple] : supple [No Supraclavicular Adenopathy] : no supraclavicular adenopathy [Clear to Auscultation Bilat] : clear to auscultation bilaterally [Normal Sinus Rhythm] : normal sinus rhythm [No Gallops] : no gallops [No Rubs] : no pericardial rub [Examined in the supine and seated position] : examined in the supine and seated position [Symmetrical] : symmetrical [Grade 2] : Ptosis Grade 2 [No dominant masses] : no dominant masses in right breast  [No dominant masses] : no dominant masses left breast [No Nipple Retraction] : no left nipple retraction [No Nipple Discharge] : no left nipple discharge [Soft] : abdomen soft [No Axillary Lymphadenopathy] : no left axillary lymphadenopathy [Normal Bowel Sounds] : normal bowel sounds  [No Hepato-Splenomegaly] : no hepato-splenomegaly [No Edema] : no edema [No Rashes] : no rashes [No Ulceration] : no ulceration [de-identified] : Lumpectomy incision at 12-1:00 . [de-identified] : Incision healing well.

## 2021-05-20 RX ORDER — OXYCODONE HYDROCHLORIDE 5 MG/1
5 TABLET ORAL ONCE
Refills: 0 | Status: DISCONTINUED | OUTPATIENT
Start: 2021-05-21 | End: 2021-05-21

## 2021-05-20 RX ORDER — FENTANYL CITRATE 50 UG/ML
50 INJECTION INTRAVENOUS
Refills: 0 | Status: DISCONTINUED | OUTPATIENT
Start: 2021-05-21 | End: 2021-05-21

## 2021-05-20 RX ORDER — ONDANSETRON 8 MG/1
4 TABLET, FILM COATED ORAL ONCE
Refills: 0 | Status: DISCONTINUED | OUTPATIENT
Start: 2021-05-21 | End: 2021-05-21

## 2021-05-20 RX ORDER — SODIUM CHLORIDE 9 MG/ML
1000 INJECTION, SOLUTION INTRAVENOUS
Refills: 0 | Status: DISCONTINUED | OUTPATIENT
Start: 2021-05-21 | End: 2021-05-21

## 2021-05-21 ENCOUNTER — RESULT REVIEW (OUTPATIENT)
Age: 56
End: 2021-05-21

## 2021-05-21 ENCOUNTER — NON-APPOINTMENT (OUTPATIENT)
Age: 56
End: 2021-05-21

## 2021-05-21 ENCOUNTER — OUTPATIENT (OUTPATIENT)
Dept: INPATIENT UNIT | Facility: HOSPITAL | Age: 56
LOS: 1 days | Discharge: HOME | End: 2021-05-21
Payer: COMMERCIAL

## 2021-05-21 ENCOUNTER — APPOINTMENT (OUTPATIENT)
Dept: BREAST CENTER | Facility: HOSPITAL | Age: 56
End: 2021-05-21

## 2021-05-21 VITALS
RESPIRATION RATE: 16 BRPM | OXYGEN SATURATION: 99 % | TEMPERATURE: 98 F | HEART RATE: 77 BPM | SYSTOLIC BLOOD PRESSURE: 137 MMHG | DIASTOLIC BLOOD PRESSURE: 88 MMHG

## 2021-05-21 VITALS
DIASTOLIC BLOOD PRESSURE: 83 MMHG | SYSTOLIC BLOOD PRESSURE: 148 MMHG | OXYGEN SATURATION: 99 % | TEMPERATURE: 98 F | HEIGHT: 68 IN | HEART RATE: 84 BPM | RESPIRATION RATE: 15 BRPM | WEIGHT: 154.98 LBS

## 2021-05-21 DIAGNOSIS — N64.89 OTHER SPECIFIED DISORDERS OF BREAST: ICD-10-CM

## 2021-05-21 DIAGNOSIS — N60.22 FIBROADENOSIS OF LEFT BREAST: ICD-10-CM

## 2021-05-21 DIAGNOSIS — D24.2 BENIGN NEOPLASM OF LEFT BREAST: ICD-10-CM

## 2021-05-21 DIAGNOSIS — Z98.890 OTHER SPECIFIED POSTPROCEDURAL STATES: Chronic | ICD-10-CM

## 2021-05-21 DIAGNOSIS — Z79.899 OTHER LONG TERM (CURRENT) DRUG THERAPY: ICD-10-CM

## 2021-05-21 DIAGNOSIS — N60.32 FIBROSCLEROSIS OF LEFT BREAST: ICD-10-CM

## 2021-05-21 DIAGNOSIS — N64.1 FAT NECROSIS OF BREAST: ICD-10-CM

## 2021-05-21 DIAGNOSIS — N60.02 SOLITARY CYST OF LEFT BREAST: ICD-10-CM

## 2021-05-21 DIAGNOSIS — N60.82 OTHER BENIGN MAMMARY DYSPLASIAS OF LEFT BREAST: ICD-10-CM

## 2021-05-21 DIAGNOSIS — R92.0 MAMMOGRAPHIC MICROCALCIFICATION FOUND ON DIAGNOSTIC IMAGING OF BREAST: ICD-10-CM

## 2021-05-21 DIAGNOSIS — Z92.3 PERSONAL HISTORY OF IRRADIATION: ICD-10-CM

## 2021-05-21 DIAGNOSIS — Z85.3 PERSONAL HISTORY OF MALIGNANT NEOPLASM OF BREAST: ICD-10-CM

## 2021-05-21 PROCEDURE — 76098 X-RAY EXAM SURGICAL SPECIMEN: CPT | Mod: 26

## 2021-05-21 PROCEDURE — 88307 TISSUE EXAM BY PATHOLOGIST: CPT | Mod: 26

## 2021-05-21 PROCEDURE — 88307 TISSUE EXAM BY PATHOLOGIST: CPT

## 2021-05-21 PROCEDURE — 76098 X-RAY EXAM SURGICAL SPECIMEN: CPT

## 2021-05-21 PROCEDURE — 19125 EXCISION BREAST LESION: CPT

## 2021-05-21 RX ORDER — TAMOXIFEN CITRATE 20 MG/1
1 TABLET, FILM COATED ORAL
Qty: 0 | Refills: 0 | DISCHARGE

## 2021-05-21 RX ORDER — CHOLECALCIFEROL (VITAMIN D3) 125 MCG
0 CAPSULE ORAL
Qty: 0 | Refills: 0 | DISCHARGE

## 2021-05-21 RX ORDER — FLUTICASONE PROPIONATE 50 MCG
0 SPRAY, SUSPENSION NASAL
Qty: 0 | Refills: 0 | DISCHARGE

## 2021-05-21 NOTE — BRIEF OPERATIVE NOTE - NSICDXBRIEFPOSTOP_GEN_ALL_CORE_FT
POST-OP DIAGNOSIS:  Atypical ductal hyperplasia of left breast 21-May-2021 11:23:45  Yancy Berrios

## 2021-05-21 NOTE — ASU DISCHARGE PLAN (ADULT/PEDIATRIC) - CARE PROVIDER_API CALL
Yancy Berrios  SURGERY  [address] [place] [address] [place] [address]  Phone: [phone]  Fax: [phone]  Established Patient  Scheduled Appointment: 06/01/2021 09:30 AM

## 2021-05-21 NOTE — ASU DISCHARGE PLAN (ADULT/PEDIATRIC) - PROVIDER TOKENS
PROVIDER:[[id number]],SCHEDULEDAPPT:[06/01/2021],SCHEDULEDAPPTTIME:[09:30 AM],ESTABLISHEDPATIENT:[T]]

## 2021-05-21 NOTE — BRIEF OPERATIVE NOTE - NSICDXBRIEFPROCEDURE_GEN_ALL_CORE_FT
PROCEDURES:  Excisional breast biopsy with radiologic localization 21-May-2021 11:23:17 Left breast Yancy Berrios

## 2021-05-21 NOTE — ASU DISCHARGE PLAN (ADULT/PEDIATRIC) - CALL YOUR DOCTOR IF YOU HAVE ANY OF THE FOLLOWING:
101.5/Bleeding that does not stop/Swelling that gets worse/Fever greater than (need to indicate Fahrenheit or Celsius)

## 2021-05-21 NOTE — ASU DISCHARGE PLAN (ADULT/PEDIATRIC) - ASU DC SPECIAL INSTRUCTIONSFT
1. Apply ice pack to left breast day of surgery 15 minutes on/off  2. Do NOT remove bandage  3. Use Extra strength tylenol for mild to moderate pain. Do NOT take Motrin or Aleve  4. A prescription has been sent to your pharmacy electronically for pain medication ( if you need it).  5.  Resume Tamoxifen 1 week after surgery

## 2021-06-01 ENCOUNTER — APPOINTMENT (OUTPATIENT)
Dept: BREAST CENTER | Facility: CLINIC | Age: 56
End: 2021-06-01
Payer: COMMERCIAL

## 2021-06-01 ENCOUNTER — RESULT REVIEW (OUTPATIENT)
Age: 56
End: 2021-06-01

## 2021-06-01 DIAGNOSIS — R92.0 MAMMOGRAPHIC MICROCALCIFICATION FOUND ON DIAGNOSTIC IMAGING OF BREAST: ICD-10-CM

## 2021-06-01 DIAGNOSIS — R92.8 OTHER ABNORMAL AND INCONCLUSIVE FINDINGS ON DIAGNOSTIC IMAGING OF BREAST: ICD-10-CM

## 2021-06-01 PROCEDURE — 99024 POSTOP FOLLOW-UP VISIT: CPT

## 2021-06-01 NOTE — PHYSICAL EXAM
[de-identified] : Lumpectomy incision at 12:00. [de-identified] : Surgical biopsy incision at 6:00 periareolar region healing well. Suture removed.

## 2021-06-01 NOTE — DATA REVIEWED
[FreeTextEntry1] : Mammogram:  09/19/18   Findings:  1. Calcifications superior central left breast.  2.Questionable asymmetry lateral left breast and microcalcifications.  Diagnostic mammogram: 9/27/18   Findings:  Right breast - Grouped coarse heterogeneous calcifications in the posterior superior central right breast spanning 8 mm.  Additional calcifications seen both anterior and posterior.    Left breast - grouped punctate and amorphous calcifications the majority of which layer as benign milk of calcium The area of asymmetry laterally does not persist on compression imaging.  Breast ultrasound:  9/27/18  Findings:  Right breast- 12 x 7 x 11 mm hypoechoic mass at 12:00 N2. Left breast - 6 mm hypoechoic mass at 1:00 N4.  5 mm cyst at 1:00 N5.  Stereotactic Biopsies RIGHT breast x 2: 12/10/18   1) posterior central calcifications  Pathology:  Ectatic ducts with columnar cell change and numerous calcifications. 2) Right breast, posterior central, calcifications( anterior group)   Pathology:  DCIS, solid and cribriform type, intermediate to  high nuclear grade with necrosis. (  Ultrasound guided core biopsy Right breast nodule (12:00 N2)   PATHOLOGY: Fibroadenoma  Breast MRI:  1/22/19   Results:  Right breast - 1 cm area of enhancement right breast near biopsy clip  (tophat) area of DCIS.   In the LOQ Right breast - 8 mm lobulated oval enhancing mass.  MRI biopsy recommended.   Left breast - at 12:00 there is nonmass enhancement measuring 12 mm.  MR biopsy recommended.   Axilla:  No adenopathy.  14 mm T2 hyperintense liver lesion, probable cyst or hemangioma.   \par \par SURGICAL PATHOLOGY:  3/27/19   ( rIGHT LUMPECTOMY 12:00)   RESULTS:   intermediate to high grade DCIS. 2 foci of infiltrating ductal carcinoma ( 7/9) measuring 2 and 3 mm each.  Margins of resection negative.   ER/[place]/HER2 - 50%/1-5%/negative.\par \par Right [place] Node Biopsy:  4/18/19    Results:  2 sentinel nodes - negative for metastases and 1 nonsentinel node - negative.\par \par Left Diagnostic  Mammogram:  9/4/20    Findings:  Stable calcifications UOQ.\par \par Mammogram:  1/21/21         Findings: Probable benign calcifications left breast for which a 6 month follow up mammogram is recommended.  \par \par Breast Ultrasound: 1/21/21   Findings:  Stable right breast 12 mm nodule at 12:00 N2, previously biopsied.\par Left breast - stable nodule at 1:00 N4. cysts.\par \par Mammogram:   4/7/21         Findings:  Right breast - Stable calcifications Probable benign relatively scattered calcifications UOQ right breast at lumpectomy site.  Left breast  - Amorphous calcifications mid outer left breast best seen in the CC view. Attempted vacuum assisted stereotactic biopsy recommended.  Faint calcifications in the further posterior outer aspect of the left breast for which a 6 month follow up mammogram is recommended. .   \par \par Breast Ultrasound : 4/7/21  Findings:  Right breast - Stable 12 mm nodule at 12:00 N2. 4 x 3 x 3 mm anechoic nodule likely an oil cyst at 9:00 N5. 6 month follow up.\par \par Left stereotactic biopsy:  4/26/21   Pathology :  ATYPICAL DUCTAL HYPERPLASIA.  ( Tophat clip)\par \par SURGICAL PATHOLOGY: 5/21/21  RESULTS:  Left breast (6:00) - Focal proliferative changes. Fibrocystic tissue.  Calcifications within sclerosing adenosis and fibroadenomatoid changes. \par

## 2021-06-01 NOTE — HISTORY OF PRESENT ILLNESS
[FreeTextEntry1] : 55 year old perimenopausal female underwent a right lumpectomy with Ruth localization on 3/22/19 for DCIS.  Final pathology revealed 2 small foci of invasive ductal carcinoma.  Margins of resection were negative.   The patient then underwent a right sentinel node biopsy on 4/18/19. [place] nodes were negative.  She completed a course of radiation therapy and was started on Tamoxifen. The patient recently underwent a left stereotactic biopsy for\par microcalcifications.  Pathology revealed  Atypical Ductal Hyperplasia. She underwent for a left excisional breast biopsy with [place] localization on 5/21/21.  The patient presents for her postop visit.

## 2022-02-03 ENCOUNTER — RESULT REVIEW (OUTPATIENT)
Age: 57
End: 2022-02-03

## 2022-02-03 ENCOUNTER — APPOINTMENT (OUTPATIENT)
Dept: ULTRASOUND IMAGING | Facility: CLINIC | Age: 57
End: 2022-02-03
Payer: COMMERCIAL

## 2022-02-03 ENCOUNTER — OUTPATIENT (OUTPATIENT)
Dept: OUTPATIENT SERVICES | Facility: HOSPITAL | Age: 57
LOS: 1 days | End: 2022-02-03
Payer: COMMERCIAL

## 2022-02-03 ENCOUNTER — APPOINTMENT (OUTPATIENT)
Dept: MAMMOGRAPHY | Facility: CLINIC | Age: 57
End: 2022-02-03
Payer: COMMERCIAL

## 2022-02-03 DIAGNOSIS — Z98.890 OTHER SPECIFIED POSTPROCEDURAL STATES: Chronic | ICD-10-CM

## 2022-02-03 DIAGNOSIS — R92.0 MAMMOGRAPHIC MICROCALCIFICATION FOUND ON DIAGNOSTIC IMAGING OF BREAST: ICD-10-CM

## 2022-02-03 PROCEDURE — G0279: CPT

## 2022-02-03 PROCEDURE — 77066 DX MAMMO INCL CAD BI: CPT | Mod: 26

## 2022-02-03 PROCEDURE — G0279: CPT | Mod: 26

## 2022-02-03 PROCEDURE — 76641 ULTRASOUND BREAST COMPLETE: CPT | Mod: 26,50

## 2022-02-03 PROCEDURE — 77066 DX MAMMO INCL CAD BI: CPT

## 2022-02-03 PROCEDURE — 76641 ULTRASOUND BREAST COMPLETE: CPT

## 2022-02-10 ENCOUNTER — OUTPATIENT (OUTPATIENT)
Dept: OUTPATIENT SERVICES | Facility: HOSPITAL | Age: 57
LOS: 1 days | End: 2022-02-10
Payer: COMMERCIAL

## 2022-02-10 ENCOUNTER — APPOINTMENT (OUTPATIENT)
Dept: MAMMOGRAPHY | Facility: CLINIC | Age: 57
End: 2022-02-10
Payer: COMMERCIAL

## 2022-02-10 ENCOUNTER — RESULT REVIEW (OUTPATIENT)
Age: 57
End: 2022-02-10

## 2022-02-10 DIAGNOSIS — Z98.890 OTHER SPECIFIED POSTPROCEDURAL STATES: Chronic | ICD-10-CM

## 2022-02-10 DIAGNOSIS — R92.0 MAMMOGRAPHIC MICROCALCIFICATION FOUND ON DIAGNOSTIC IMAGING OF BREAST: ICD-10-CM

## 2022-02-10 PROCEDURE — 88305 TISSUE EXAM BY PATHOLOGIST: CPT | Mod: 26

## 2022-02-10 PROCEDURE — 19081 BX BREAST 1ST LESION STRTCTC: CPT | Mod: LT

## 2022-02-10 PROCEDURE — A4648: CPT

## 2022-02-10 PROCEDURE — 77065 DX MAMMO INCL CAD UNI: CPT | Mod: 26,LT

## 2022-02-10 PROCEDURE — 77065 DX MAMMO INCL CAD UNI: CPT

## 2022-02-10 PROCEDURE — 88305 TISSUE EXAM BY PATHOLOGIST: CPT

## 2022-02-10 PROCEDURE — 19081 BX BREAST 1ST LESION STRTCTC: CPT

## 2022-02-17 ENCOUNTER — NON-APPOINTMENT (OUTPATIENT)
Age: 57
End: 2022-02-17

## 2022-02-17 ENCOUNTER — APPOINTMENT (OUTPATIENT)
Dept: BREAST CENTER | Facility: CLINIC | Age: 57
End: 2022-02-17
Payer: COMMERCIAL

## 2022-02-17 VITALS
SYSTOLIC BLOOD PRESSURE: 170 MMHG | WEIGHT: 155 LBS | DIASTOLIC BLOOD PRESSURE: 100 MMHG | HEART RATE: 97 BPM | HEIGHT: 68 IN | BODY MASS INDEX: 23.49 KG/M2

## 2022-02-17 VITALS — DIASTOLIC BLOOD PRESSURE: 95 MMHG | HEART RATE: 83 BPM | SYSTOLIC BLOOD PRESSURE: 163 MMHG

## 2022-02-17 DIAGNOSIS — R92.0 MAMMOGRAPHIC MICROCALCIFICATION FOUND ON DIAGNOSTIC IMAGING OF BREAST: ICD-10-CM

## 2022-02-17 PROCEDURE — 99214 OFFICE O/P EST MOD 30 MIN: CPT

## 2022-02-17 NOTE — DATA REVIEWED
[FreeTextEntry1] :   Stereotactic Biopsies RIGHT breast x 2: 12/10/18   1) posterior central calcifications, rhona clip  Pathology:  Ectatic ducts with columnar cell change and numerous calcifications. 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 \par \par (  Ultrasound guided core biopsy Right breast nodule (12:00 N2) (1/7/2019), ribbon clip   PATHOLOGY: Fibroadenoma  \par \par MR bx 2/14/21\par - R LOQ, hourglass clip migrated 5-6 cm medially within hematoma = Non-proliferative fibrocystic changes, including patchy dense, focally nodular fibrosis, focal sclerosing adenosis, and a few benign cysts with apocrine metaplasia.\par - L breast 12:00, cork clip = Fibroadenoma (0.4 cm): Background of non-proliferative fibrocystic changes including patchy, focally nodular fibrosis, a few benign cysts with apocrine metaplasia, and focal sclerosing adenosis.\par - concordant, 1 yr MRI\par \par SURGICAL PATHOLOGY:  3/27/19   ( rIGHT LUMPECTOMY 12:00)   RESULTS:   intermediate to high grade DCIS. 2 foci of infiltrating ductal carcinoma ( 7/9) measuring 2 and 3 mm each.  Margins of resection negative.   ER/[place]/HER2 - 50%/1-5%/negative.\par \par Right [place] Node Biopsy:  4/18/19    Results:  2 sentinel nodes - negative for metastases and 1 nonsentinel node - negative.\par \par Left stereotactic biopsy:  4/26/21   Pathology :  ATYPICAL DUCTAL HYPERPLASIA.  ( Tophat clip)\par \par SURGICAL PATHOLOGY: 5/21/21  RESULTS:  Left breast (6:00) - Focal proliferative changes. Fibrocystic tissue.  Calcifications within sclerosing adenosis and fibroadenomatoid changes. \par \par B/l mammogram and complete US 2/3/22\par - heterogenously dense\par - asymmetry in R inner breast partially effaces on spot compression, comparable to prior\par - calcifications in L breast 6:00 anterior, increased; stereotactic bx\par - 1.1 x 0.7 x 1.3 cm nodule in R breast 12:00 N2, biopsied [stable]\par - 0.6 x 0.2 x 0.6 cm nodule in L breast 1:00 N4, stable\par - BIRADS 4A\par \par Stereotactic needle bx 2/10/22\par - L breast 6:00, cork clip = sclerosing adenosis, calcifications, fibrosis, cysts; concordant; resume annual mammography

## 2022-02-17 NOTE — HISTORY OF PRESENT ILLNESS
[FreeTextEntry1] : Ms. Chan is a 56 year old woman here for a follow-up for surveillance for breast cancer. Her breast history is notable for a benign left breast excision for ADH (2021), multiple benign b/l needle biopsies, and significant for \par \par 1.) Right breast infiltrating ductal carcinoma,upgraded from DCIS on lumpectomy, in 2019 at age 53, pathologic stage I, pT1 pN0, SBR 6-7/9, ER 50%, [place] 1-2%, Her2 1-2, CISH negative\par - s/p R lumpectomy (3/22/19, Dr. Berrios), sentinel node biopsy (4/18/19) = 0.2 and 0.3 cm infiltrating ductal carcinoma, 0/3 R sln and non-sln\par - s/p radiation (Dr. Bustos)\par - on tamoxifen (Dr. Loomis)\par \par She recently had another benign needle biopsy in the left breast. No palpable breast or axillary lumps, nipple discharge, or skin changes. She gets very nervous in physician's offices.\par \par Her family history is not significant for any breast cancer.

## 2022-02-17 NOTE — PHYSICAL EXAM
[Normocephalic] : normocephalic [Sclera nonicteric] : sclera nonicteric [Supple] : supple [No Supraclavicular Adenopathy] : no supraclavicular adenopathy [No Cervical Adenopathy] : no cervical adenopathy [Clear to Auscultation Bilat] : clear to auscultation bilaterally [Normal Sinus Rhythm] : normal sinus rhythm [No Rubs] : no pericardial rub [No dominant masses] : no dominant masses in right breast  [No dominant masses] : no dominant masses left breast [No Nipple Retraction] : no left nipple retraction [No Nipple Discharge] : no left nipple discharge [No Axillary Lymphadenopathy] : no left axillary lymphadenopathy [Soft] : abdomen soft [No Edema] : no edema [No Rashes] : no rashes [No Ulceration] : no ulceration [de-identified] : Curvilinear UOQ scar [de-identified] : Inferior periareolar scar

## 2022-02-17 NOTE — CONSULT LETTER
[Dear  ___] : Dear  [unfilled], [Courtesy Letter:] : I had the pleasure of seeing your patient, [unfilled], in my office today. [Please see my note below.] : Please see my note below. [Consult Closing:] : Thank you very much for allowing me to participate in the care of this patient.  If you have any questions, please do not hesitate to contact me. [Sincerely,] : Sincerely, [DrKenneth  ___] : Dr. NICOLAS [DrKenneth ___] : Dr. NICOLAS [FreeTextEntry3] : Francesca Díaz MD FACS

## 2022-07-02 ENCOUNTER — OUTPATIENT (OUTPATIENT)
Dept: OUTPATIENT SERVICES | Facility: HOSPITAL | Age: 57
LOS: 1 days | Discharge: HOME | End: 2022-07-02

## 2022-07-02 DIAGNOSIS — Z98.890 OTHER SPECIFIED POSTPROCEDURAL STATES: Chronic | ICD-10-CM

## 2022-07-02 DIAGNOSIS — C50.911 MALIGNANT NEOPLASM OF UNSPECIFIED SITE OF RIGHT FEMALE BREAST: ICD-10-CM

## 2022-07-06 PROBLEM — Z01.818 PRE-OP TESTING: Status: RESOLVED | Noted: 2021-05-18 | Resolved: 2022-07-06

## 2022-07-07 ENCOUNTER — APPOINTMENT (OUTPATIENT)
Dept: HEMATOLOGY ONCOLOGY | Facility: CLINIC | Age: 57
End: 2022-07-07

## 2022-07-07 VITALS
TEMPERATURE: 98.5 F | DIASTOLIC BLOOD PRESSURE: 106 MMHG | BODY MASS INDEX: 25.72 KG/M2 | HEART RATE: 92 BPM | RESPIRATION RATE: 16 BRPM | SYSTOLIC BLOOD PRESSURE: 170 MMHG | OXYGEN SATURATION: 98 % | WEIGHT: 169.13 LBS

## 2022-07-07 DIAGNOSIS — Z01.818 ENCOUNTER FOR OTHER PREPROCEDURAL EXAMINATION: ICD-10-CM

## 2022-07-07 PROCEDURE — 99213 OFFICE O/P EST LOW 20 MIN: CPT

## 2022-07-07 NOTE — PHYSICAL EXAM
[Normal] : full range of motion and no deformities appreciated [de-identified] : anicteric [de-identified] : no c/c/e [de-identified] : declines - scheduled to see Dr. Díaz 8/18/22 [de-identified] : no rashes

## 2022-07-07 NOTE — ASSESSMENT
[FreeTextEntry1] : Patient is a 57 y.o. with an ER+, HER2, right sided stage IA BC, s/p lumpectomy and XRT, on Tamoxifen since 7/2019. \par Tolerating well.  Clinically remains without evidence of disease. \par Plan is to remain on Tamoxifen for a total of 5 years, or until 7/2024. \par \par Continue routine surveillance:\par Mammo/Sono: 2/3/22 - BiRADS 4A - Interval increase in focal calcifications at the anterior 6:00 axis of the left breast.   Had left breast biopsy 2/10/22 6:00 - multiple foci of sclerosing adenosis, some containing calcifications. \par Saw Dr. Díaz 2/17/22 - goes every 6 months. \par GYN:  Goes yearly, NP Idania Rios.   LMP 12/2018.  Scheduled for August.\par Bone Density:  Not yet.  Rx given to have baseline with next mammogram in 2/2023. \par Colonoscopy: NEVER - aware of importance.  Is planning to get this August.  Reports her  had done recently and she will use his MD.  \par RTO 1 year, sooner PRN.\par \par Dr. Jonathan Boxer\par Idania Rios, NP\par Dr. Yancy Berrios-> Francesca Díaz\par Dr. Nany Bustos\par

## 2022-07-07 NOTE — HISTORY OF PRESENT ILLNESS
[Disease: _____________________] : Disease: [unfilled] [T: ___] : T[unfilled] [N: ___] : N[unfilled] [AJCC Stage: ____] : AJCC Stage: [unfilled] [de-identified] : YING MCGRATH is a 57 y.o. who we are following for an ER+, HER2-, right sided stage IA breast cancer. \par \par 9/19/18 - Had her 1st screening mammogram which revealed b/l calcifications as well as left breast asymmetry.   Patient was perimenopausal (LMP 12/2018), age 53.\par 9/27/20 -  B/LSono - Right breast with a 1.2cm hypoechoic mass 12:00 2N - bx recommended.  Left breast 9mm hypoechoic mass 1 4N and 5mm cyst 1 5N felt to be benign. \par 9/27/20 - Mammo additional views - Right with grouped course calcifications in the posterior central right breast spanning 8mm with additional calcifications both anterior and posterior spanning 4cm.  Left breast with grouped punctate and amorphous calcifications, the majority of which layer as benign milk of calcium.  \par 12/10/18 - Right breast biopsy (posterior and anterior) - DCIS,intermediate to high nuclear grade, solid and cribriform type, with central necrosis and abundant calcifications.  \par ER 80 - 90%, [place] 60%\par 1/7/19 - Additional biopsy right breast 12:00 N2 - Negative for malignancy - Fibroadenoma with focal apocrine metaplasia. \par 2/14/19 - Additional biopsies  - right LOQ, left 12:00 - negative for malignancy.\par \par 3/22/19 - Right lumpectomy - PATH with two separate foci of IDC measuring 2mm and 3mm (6 - 7/9), with DCIS 0.4cm and 0.6cm, involving a fibroadenoma.\par pT1a, pNx.  HER2 1- 2+, CISH neg\par 4/18/19 - Right axillary SLND - 2 SLN's and 1 non SLN negative. \par \par 6/19/19 - Completed XRT to right breast. \par 7/2019 - Started Tamoxifen [de-identified] : invasive ductal carcinoma, [place] score 6 - 7/9 [de-identified] : ER 80 - 90%, [place] 60%, HER2 1- 2+, CISH neg [de-identified] : Patient tolerating Tamoxifen well. She has no complaints. \par Had left breast biopsy in Feb that was benign.  \par Had COVID 4/2022 - mild case.  Has had initial vaccines. \par Patient denies any changes in her family, medical, or social history since her last visit of 9/28/20.

## 2022-07-08 DIAGNOSIS — Z08 ENCOUNTER FOR FOLLOW-UP EXAMINATION AFTER COMPLETED TREATMENT FOR MALIGNANT NEOPLASM: ICD-10-CM

## 2022-07-08 DIAGNOSIS — Z51.81 ENCOUNTER FOR THERAPEUTIC DRUG LEVEL MONITORING: ICD-10-CM

## 2022-09-26 ENCOUNTER — APPOINTMENT (OUTPATIENT)
Dept: BREAST CENTER | Facility: CLINIC | Age: 57
End: 2022-09-26

## 2022-09-26 ENCOUNTER — RESULT REVIEW (OUTPATIENT)
Age: 57
End: 2022-09-26

## 2022-09-26 VITALS
DIASTOLIC BLOOD PRESSURE: 96 MMHG | BODY MASS INDEX: 24.25 KG/M2 | HEART RATE: 98 BPM | WEIGHT: 160 LBS | SYSTOLIC BLOOD PRESSURE: 164 MMHG | HEIGHT: 68 IN

## 2022-09-26 PROCEDURE — 99214 OFFICE O/P EST MOD 30 MIN: CPT

## 2022-09-26 NOTE — DATA REVIEWED
[FreeTextEntry1] :   Stereotactic Biopsies RIGHT breast x 2: 12/10/18   1) posterior central calcifications, rhona clip  Pathology:  Ectatic ducts with columnar cell change and numerous calcifications. 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 \par \par (  Ultrasound guided core biopsy Right breast nodule (12:00 N2) (1/7/2019), ribbon clip   PATHOLOGY: Fibroadenoma  \par \par MR bx 2/14/21\par - R LOQ, hourglass clip migrated 5-6 cm medially within hematoma = Non-proliferative fibrocystic changes, including patchy dense, focally nodular fibrosis, focal sclerosing adenosis, and a few benign cysts with apocrine metaplasia.\par - L breast 12:00, cork clip = Fibroadenoma (0.4 cm): Background of non-proliferative fibrocystic changes including patchy, focally nodular fibrosis, a few benign cysts with apocrine metaplasia, and focal sclerosing adenosis.\par - concordant, 1 yr MRI\par \par SURGICAL PATHOLOGY:  3/27/19   ( rIGHT LUMPECTOMY 12:00)   RESULTS:   intermediate to high grade DCIS. 2 foci of infiltrating ductal carcinoma ( 7/9) measuring 2 and 3 mm each.  Margins of resection negative.   ER/[place]/HER2 - 50%/1-5%/negative.\par \par Right [place] Node Biopsy:  4/18/19    Results:  2 sentinel nodes - negative for metastases and 1 nonsentinel node - negative.\par \par Left stereotactic biopsy:  4/26/21   Pathology :  ATYPICAL DUCTAL HYPERPLASIA.  ( Tophat clip)\par \par SURGICAL PATHOLOGY: 5/21/21  RESULTS:  Left breast (6:00) - Focal proliferative changes. Fibrocystic tissue.  Calcifications within sclerosing adenosis and fibroadenomatoid changes. \par \par B/l mammogram and complete US 2/3/22\par - heterogenously dense\par - asymmetry in R inner breast partially effaces on spot compression, comparable to prior\par - calcifications in L breast 6:00 anterior, increased; stereotactic bx\par - 1.1 x 0.7 x 1.3 cm nodule in R breast 12:00 N2, biopsied [stable]\par - 0.6 x 0.2 x 0.6 cm nodule in L breast 1:00 N4, stable\par - BIRADS 4A\par \par Stereotactic needle bx 2/10/22\par - L breast 6:00, cork clip = sclerosing adenosis, calcifications, fibrosis, cysts; concordant; resume annual mammography

## 2022-09-26 NOTE — HISTORY OF PRESENT ILLNESS
[FreeTextEntry1] : Ms. Chan is a 57 year old woman here for a follow-up for surveillance for breast cancer. Her breast history is notable for a benign left breast excision for ADH (2021), multiple benign b/l needle biopsies, and significant for \par \par 1.) Right breast infiltrating ductal carcinoma,upgraded from DCIS on lumpectomy, in 2019 at age 53, pathologic stage I, pT1 pN0, SBR 6-7/9, ER 50%, [place] 1-2%, Her2 1-2, CISH negative\par - s/p R lumpectomy (3/22/19, Dr. Berrios), sentinel node biopsy (4/18/19) = 0.2 and 0.3 cm infiltrating ductal carcinoma, 0/3 R sln and non-sln\par - s/p radiation (Dr. Bustos)\par - on tamoxifen (Dr. Loomis)\par \par She is doing well. No complaints. Her daughter will be graduating PA school in January. \par \par Her family history is not significant for any breast cancer.

## 2022-09-26 NOTE — PHYSICAL EXAM
[Normocephalic] : normocephalic [Sclera nonicteric] : sclera nonicteric [Supple] : supple [No Supraclavicular Adenopathy] : no supraclavicular adenopathy [No Cervical Adenopathy] : no cervical adenopathy [Clear to Auscultation Bilat] : clear to auscultation bilaterally [Normal Sinus Rhythm] : normal sinus rhythm [No Rubs] : no pericardial rub [No dominant masses] : no dominant masses in right breast  [No dominant masses] : no dominant masses left breast [No Nipple Retraction] : no left nipple retraction [No Nipple Discharge] : no left nipple discharge [No Axillary Lymphadenopathy] : no left axillary lymphadenopathy [Soft] : abdomen soft [No Edema] : no edema [No Rashes] : no rashes [No Ulceration] : no ulceration [de-identified] : Curvilinear UOQ scar [de-identified] : Inferior periareolar scar

## 2023-03-01 ENCOUNTER — APPOINTMENT (OUTPATIENT)
Dept: ULTRASOUND IMAGING | Facility: CLINIC | Age: 58
End: 2023-03-01

## 2023-03-01 ENCOUNTER — APPOINTMENT (OUTPATIENT)
Dept: MAMMOGRAPHY | Facility: CLINIC | Age: 58
End: 2023-03-01

## 2023-05-02 ENCOUNTER — OUTPATIENT (OUTPATIENT)
Dept: OUTPATIENT SERVICES | Facility: HOSPITAL | Age: 58
LOS: 1 days | End: 2023-05-02
Payer: COMMERCIAL

## 2023-05-02 ENCOUNTER — RESULT REVIEW (OUTPATIENT)
Age: 58
End: 2023-05-02

## 2023-05-02 ENCOUNTER — APPOINTMENT (OUTPATIENT)
Dept: ULTRASOUND IMAGING | Facility: CLINIC | Age: 58
End: 2023-05-02
Payer: COMMERCIAL

## 2023-05-02 ENCOUNTER — APPOINTMENT (OUTPATIENT)
Dept: MAMMOGRAPHY | Facility: CLINIC | Age: 58
End: 2023-05-02
Payer: COMMERCIAL

## 2023-05-02 DIAGNOSIS — Z98.890 OTHER SPECIFIED POSTPROCEDURAL STATES: Chronic | ICD-10-CM

## 2023-05-02 DIAGNOSIS — Z85.3 PERSONAL HISTORY OF MALIGNANT NEOPLASM OF BREAST: ICD-10-CM

## 2023-05-02 DIAGNOSIS — Z12.39 ENCOUNTER FOR OTHER SCREENING FOR MALIGNANT NEOPLASM OF BREAST: ICD-10-CM

## 2023-05-02 PROCEDURE — 76641 ULTRASOUND BREAST COMPLETE: CPT | Mod: 26,50

## 2023-05-02 PROCEDURE — 76641 ULTRASOUND BREAST COMPLETE: CPT

## 2023-05-02 PROCEDURE — G0279: CPT | Mod: 26

## 2023-05-02 PROCEDURE — 77066 DX MAMMO INCL CAD BI: CPT | Mod: 26

## 2023-05-02 PROCEDURE — 77066 DX MAMMO INCL CAD BI: CPT

## 2023-05-02 PROCEDURE — G0279: CPT

## 2023-06-01 ENCOUNTER — APPOINTMENT (OUTPATIENT)
Dept: BREAST CENTER | Facility: CLINIC | Age: 58
End: 2023-06-01
Payer: COMMERCIAL

## 2023-06-01 VITALS
SYSTOLIC BLOOD PRESSURE: 191 MMHG | BODY MASS INDEX: 23.95 KG/M2 | HEIGHT: 68 IN | WEIGHT: 158 LBS | DIASTOLIC BLOOD PRESSURE: 110 MMHG | HEART RATE: 83 BPM

## 2023-06-01 VITALS — DIASTOLIC BLOOD PRESSURE: 95 MMHG | SYSTOLIC BLOOD PRESSURE: 163 MMHG | HEART RATE: 76 BPM

## 2023-06-01 PROCEDURE — 99214 OFFICE O/P EST MOD 30 MIN: CPT

## 2023-06-01 NOTE — HISTORY OF PRESENT ILLNESS
[FreeTextEntry1] : Ms. Chan is a 57 year old woman here for a follow-up for surveillance for breast cancer. Her breast history is notable for a benign left breast excision for ADH (2021), multiple benign b/l needle biopsies, and significant for \par \par 1.) Right breast infiltrating ductal carcinoma,upgraded from DCIS on lumpectomy, in 2019 at age 53, pathologic stage I, pT1 pN0, SBR 6-7/9, ER 50%, [place] 1-2%, Her2 1-2, CISH negative\par - s/p R lumpectomy (3/22/19, Dr. Berrios), sentinel node biopsy (4/18/19) = 0.2 and 0.3 cm infiltrating ductal carcinoma, 0/3 R sln and non-sln\par - s/p radiation (Dr. Bustos)\par - on tamoxifen (Dr. Loomis)\par \par She is doing well. No complaints. \par \par Her family history is not significant for any breast cancer.

## 2023-06-01 NOTE — DATA REVIEWED
[FreeTextEntry1] :   Stereotactic Biopsies RIGHT breast x 2: 12/10/18   1) posterior central calcifications, rhona clip  Pathology:  Ectatic ducts with columnar cell change and numerous calcifications. 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 \par \par (  Ultrasound guided core biopsy Right breast nodule (12:00 N2) (1/7/2019), ribbon clip   PATHOLOGY: Fibroadenoma  \par \par MR bx 2/14/19\par - R LOQ, hourglass clip migrated 5-6 cm medially within hematoma = Non-proliferative fibrocystic changes, including patchy dense, focally nodular fibrosis, focal sclerosing adenosis, and a few benign cysts with apocrine metaplasia.\par - L breast 12:00, cork clip = Fibroadenoma (0.4 cm): Background of non-proliferative fibrocystic changes including patchy, focally nodular fibrosis, a few benign cysts with apocrine metaplasia, and focal sclerosing adenosis.\par - concordant, 1 yr MRI\par \par SURGICAL PATHOLOGY:  3/27/19   ( RIGHT LUMPECTOMY 12:00)   RESULTS:   intermediate to high grade DCIS. 2 foci of infiltrating ductal carcinoma ( 7/9) measuring 2 and 3 mm each.  Margins of resection negative.   ER/[place]/HER2 - 50%/1-5%/negative.\par \par Right [place] Node Biopsy:  4/18/19    Results:  2 sentinel nodes - negative for metastases and 1 nonsentinel node - negative.\par \par Left stereotactic biopsy:  4/26/21   Pathology :  ATYPICAL DUCTAL HYPERPLASIA.  ( Tophat clip)\par \par SURGICAL PATHOLOGY: 5/21/21  RESULTS:  Left breast (6:00) - Focal proliferative changes. Fibrocystic tissue.  Calcifications within sclerosing adenosis and fibroadenomatoid changes. \par \par Stereotactic needle bx 2/10/22\par - L breast 6:00, cork clip = sclerosing adenosis, calcifications, fibrosis, cysts; concordant; resume annual mammography\par \par B/l mammogram and US 5/2/23\par - scattered fibroglandular density \par - 1.2 cm nodule in R breast 12:00 N2, biopsied, stable\par - 0.7 cm nodule in L breast 1:00 N4, biopsied, stable\par - BIRADS 2

## 2023-06-01 NOTE — PHYSICAL EXAM
[Normocephalic] : normocephalic [Sclera nonicteric] : sclera nonicteric [Supple] : supple [No Supraclavicular Adenopathy] : no supraclavicular adenopathy [No Cervical Adenopathy] : no cervical adenopathy [Clear to Auscultation Bilat] : clear to auscultation bilaterally [Normal Sinus Rhythm] : normal sinus rhythm [No Rubs] : no pericardial rub [No dominant masses] : no dominant masses in right breast  [No dominant masses] : no dominant masses left breast [No Nipple Retraction] : no left nipple retraction [No Nipple Discharge] : no left nipple discharge [No Axillary Lymphadenopathy] : no left axillary lymphadenopathy [Soft] : abdomen soft [No Edema] : no edema [No Rashes] : no rashes [No Ulceration] : no ulceration [de-identified] : Curvilinear UOQ scar. Superior transverse scar [de-identified] : Inferior periareolar scar [de-identified] : Tranverse scar

## 2023-07-30 ENCOUNTER — OUTPATIENT (OUTPATIENT)
Dept: OUTPATIENT SERVICES | Facility: HOSPITAL | Age: 58
LOS: 1 days | Discharge: HOME | End: 2023-07-30

## 2023-07-30 DIAGNOSIS — Z98.890 OTHER SPECIFIED POSTPROCEDURAL STATES: Chronic | ICD-10-CM

## 2023-07-30 DIAGNOSIS — C50.911 MALIGNANT NEOPLASM OF UNSPECIFIED SITE OF RIGHT FEMALE BREAST: ICD-10-CM

## 2023-08-02 ENCOUNTER — APPOINTMENT (OUTPATIENT)
Dept: HEMATOLOGY ONCOLOGY | Facility: CLINIC | Age: 58
End: 2023-08-02
Payer: COMMERCIAL

## 2023-08-02 VITALS
WEIGHT: 167.25 LBS | TEMPERATURE: 98.4 F | BODY MASS INDEX: 25.43 KG/M2 | DIASTOLIC BLOOD PRESSURE: 90 MMHG | SYSTOLIC BLOOD PRESSURE: 162 MMHG | RESPIRATION RATE: 17 BRPM | HEART RATE: 81 BPM | OXYGEN SATURATION: 99 %

## 2023-08-02 DIAGNOSIS — Z51.81 ENCOUNTER FOR THERAPEUTIC DRUG LEVEL MONITORING: ICD-10-CM

## 2023-08-02 DIAGNOSIS — Z79.810 ENCOUNTER FOR THERAPEUTIC DRUG LVL MONITORING: ICD-10-CM

## 2023-08-02 DIAGNOSIS — C50.911 MALIGNANT NEOPLASM OF UNSPECIFIED SITE OF RIGHT FEMALE BREAST: ICD-10-CM

## 2023-08-02 DIAGNOSIS — Z51.81 ENCOUNTER FOR THERAPEUTIC DRUG LVL MONITORING: ICD-10-CM

## 2023-08-02 DIAGNOSIS — Z08 ENCOUNTER FOR FOLLOW-UP EXAMINATION AFTER COMPLETED TREATMENT FOR MALIGNANT NEOPLASM: ICD-10-CM

## 2023-08-02 PROCEDURE — 99213 OFFICE O/P EST LOW 20 MIN: CPT

## 2023-08-02 RX ORDER — TAMOXIFEN CITRATE 20 MG/1
20 TABLET, FILM COATED ORAL
Qty: 90 | Refills: 3 | Status: ACTIVE | COMMUNITY
Start: 2019-05-14 | End: 1900-01-01

## 2023-08-02 NOTE — PHYSICAL EXAM
[Normal] : affect appropriate [de-identified] : anicteric [de-identified] : no c/c/e [de-identified] : declined -  saw Dr. Díaz in [place] [de-identified] : no rashes

## 2023-08-02 NOTE — ASSESSMENT
[FreeTextEntry1] : Patient is a 58 y.o. with an ER+, HER2, right sided stage IA BC, s/p lumpectomy and XRT, on Tamoxifen since 7/2019.  Tolerating well.  Clinically remains without evidence of disease.  Plan is to remain on Tamoxifen for a total of 5 years, or until 7/2024.   Continue routine surveillance: Mammo/Sono: 5/2/23 - BiRADS 2.    Saw Dr. Díaz 6/1/23 - goes every 6 months.  Will see again in December - considering MRI.  GYN:  Goes yearly, NP Idania Rios.   LMP 12/2018.    Bone Density:  Not yet.  Was given Rx to have baseline with last mammogram but patient states has been too overwhelmed with BC issues..  Now feeling better mentally so will get soon.  Colonoscopy: NEVER - Has been putting off as well due to above - aware needs to do.  Weight discussed, Tamoxifen renewed.  RTO 1 year, sooner PRN.  Dr. Jonathan Boxer Peggy Smith, [facility]

## 2023-08-02 NOTE — HISTORY OF PRESENT ILLNESS
[Disease: _____________________] : Disease: [unfilled] [T: ___] : T[unfilled] [N: ___] : N[unfilled] [AJCC Stage: ____] : AJCC Stage: [unfilled] [de-identified] : YING MCGRATH is a 58 y.o. who we are following for an ER+, HER2-, right sided stage IA breast cancer.   9/19/18 - Had her 1st screening mammogram which revealed b/l calcifications as well as left breast asymmetry.   Patient was perimenopausal (LMP 12/2018), age 53. 9/27/20 -  B/LSono - Right breast with a 1.2cm hypoechoic mass 12:00 2N - bx recommended.  Left breast 9mm hypoechoic mass 1 4N and 5mm cyst 1 5N felt to be benign.  9/27/20 - Mammo additional views - Right with grouped course calcifications in the posterior central right breast spanning 8mm with additional calcifications both anterior and posterior spanning 4cm.  Left breast with grouped punctate and amorphous calcifications, the majority of which layer as benign milk of calcium.   12/10/18 - Right breast biopsy (posterior and anterior) - DCIS,intermediate to high nuclear grade, solid and cribriform type, with central necrosis and abundant calcifications.   ER 80 - 90%, PA 60% 1/7/19 - Additional biopsy right breast 12:00 N2 - Negative for malignancy - Fibroadenoma with focal apocrine metaplasia.  2/14/19 - Additional biopsies  - right LOQ, left 12:00 - negative for malignancy.  3/22/19 - Right lumpectomy - PATH with two separate foci of IDC measuring 2mm and 3mm (6 - 7/9), with DCIS 0.4cm and 0.6cm, involving a fibroadenoma. pT1a, pNx.  HER2 1- 2+, CISH neg 4/18/19 - Right axillary SLND - 2 SLN's and 1 non SLN negative.   6/19/19 - Completed XRT to right breast.  7/2019 - Started Tamoxifen 2/10/22 - Left breast biopsy 6:00 - multiple foci of sclerosing adenosis, some containing calcifications.   [de-identified] : invasive ductal carcinoma, [place] score 6 - 7/9 [de-identified] : ER 80 - 90%, DE 60%, HER2 1- 2+, CISH neg [de-identified] : Patient tolerating Tamoxifen well. She has no complaints.  Has gained ~ 17 pounds since starting on Tamoxifen.  Patient denies any changes in her family, medical, or social history since her last visit of 7/7/22.

## 2023-10-01 PROBLEM — Z92.3 HISTORY OF RADIATION THERAPY: Status: RESOLVED | Noted: 2020-09-25 | Resolved: 2023-10-01

## 2024-01-09 ENCOUNTER — APPOINTMENT (OUTPATIENT)
Dept: BREAST CENTER | Facility: CLINIC | Age: 59
End: 2024-01-09
Payer: COMMERCIAL

## 2024-01-09 VITALS
RESPIRATION RATE: 16 BRPM | BODY MASS INDEX: 25.31 KG/M2 | HEIGHT: 68 IN | HEART RATE: 96 BPM | SYSTOLIC BLOOD PRESSURE: 175 MMHG | DIASTOLIC BLOOD PRESSURE: 96 MMHG | WEIGHT: 167 LBS

## 2024-01-09 DIAGNOSIS — Z85.3 ENCOUNTER FOR FOLLOW-UP EXAMINATION AFTER COMPLETED TREATMENT FOR MALIGNANT NEOPLASM: ICD-10-CM

## 2024-01-09 DIAGNOSIS — R92.8 OTHER ABNORMAL AND INCONCLUSIVE FINDINGS ON DIAGNOSTIC IMAGING OF BREAST: ICD-10-CM

## 2024-01-09 DIAGNOSIS — Z12.39 ENCOUNTER FOR OTHER SCREENING FOR MALIGNANT NEOPLASM OF BREAST: ICD-10-CM

## 2024-01-09 DIAGNOSIS — Z08 ENCOUNTER FOR FOLLOW-UP EXAMINATION AFTER COMPLETED TREATMENT FOR MALIGNANT NEOPLASM: ICD-10-CM

## 2024-01-09 DIAGNOSIS — Z85.3 PERSONAL HISTORY OF MALIGNANT NEOPLASM OF BREAST: ICD-10-CM

## 2024-01-09 PROCEDURE — 99214 OFFICE O/P EST MOD 30 MIN: CPT

## 2024-06-18 ENCOUNTER — NON-APPOINTMENT (OUTPATIENT)
Age: 59
End: 2024-06-18

## 2024-10-16 ENCOUNTER — APPOINTMENT (OUTPATIENT)
Dept: MAMMOGRAPHY | Facility: CLINIC | Age: 59
End: 2024-10-16
Payer: COMMERCIAL

## 2024-10-16 ENCOUNTER — APPOINTMENT (OUTPATIENT)
Dept: ULTRASOUND IMAGING | Facility: CLINIC | Age: 59
End: 2024-10-16
Payer: COMMERCIAL

## 2024-10-16 ENCOUNTER — OUTPATIENT (OUTPATIENT)
Dept: OUTPATIENT SERVICES | Facility: HOSPITAL | Age: 59
LOS: 1 days | End: 2024-10-16
Payer: COMMERCIAL

## 2024-10-16 ENCOUNTER — RESULT REVIEW (OUTPATIENT)
Age: 59
End: 2024-10-16

## 2024-10-16 DIAGNOSIS — Z98.890 OTHER SPECIFIED POSTPROCEDURAL STATES: Chronic | ICD-10-CM

## 2024-10-16 DIAGNOSIS — Z00.8 ENCOUNTER FOR OTHER GENERAL EXAMINATION: ICD-10-CM

## 2024-10-16 DIAGNOSIS — Z12.39 ENCOUNTER FOR OTHER SCREENING FOR MALIGNANT NEOPLASM OF BREAST: ICD-10-CM

## 2024-10-16 DIAGNOSIS — Z85.3 PERSONAL HISTORY OF MALIGNANT NEOPLASM OF BREAST: ICD-10-CM

## 2024-10-16 PROCEDURE — 77063 BREAST TOMOSYNTHESIS BI: CPT | Mod: 26

## 2024-10-16 PROCEDURE — 77067 SCR MAMMO BI INCL CAD: CPT | Mod: 26

## 2024-10-16 PROCEDURE — 76641 ULTRASOUND BREAST COMPLETE: CPT | Mod: 26,50

## 2024-10-16 PROCEDURE — 77067 SCR MAMMO BI INCL CAD: CPT

## 2024-10-16 PROCEDURE — 76641 ULTRASOUND BREAST COMPLETE: CPT

## 2024-10-16 PROCEDURE — 77063 BREAST TOMOSYNTHESIS BI: CPT

## 2024-10-22 ENCOUNTER — APPOINTMENT (OUTPATIENT)
Dept: MRI IMAGING | Facility: CLINIC | Age: 59
End: 2024-10-22

## 2025-01-29 ENCOUNTER — APPOINTMENT (OUTPATIENT)
Dept: MRI IMAGING | Facility: CLINIC | Age: 60
End: 2025-01-29

## 2025-03-11 ENCOUNTER — APPOINTMENT (OUTPATIENT)
Dept: BREAST CENTER | Facility: CLINIC | Age: 60
End: 2025-03-11
Payer: COMMERCIAL

## 2025-03-11 VITALS
SYSTOLIC BLOOD PRESSURE: 191 MMHG | WEIGHT: 160 LBS | HEIGHT: 68 IN | BODY MASS INDEX: 24.25 KG/M2 | HEART RATE: 104 BPM | DIASTOLIC BLOOD PRESSURE: 121 MMHG

## 2025-03-11 DIAGNOSIS — Z08 ENCOUNTER FOR FOLLOW-UP EXAMINATION AFTER COMPLETED TREATMENT FOR MALIGNANT NEOPLASM: ICD-10-CM

## 2025-03-11 DIAGNOSIS — Z85.3 ENCOUNTER FOR FOLLOW-UP EXAMINATION AFTER COMPLETED TREATMENT FOR MALIGNANT NEOPLASM: ICD-10-CM

## 2025-03-11 PROCEDURE — 99213 OFFICE O/P EST LOW 20 MIN: CPT
